# Patient Record
Sex: MALE | URBAN - METROPOLITAN AREA
[De-identification: names, ages, dates, MRNs, and addresses within clinical notes are randomized per-mention and may not be internally consistent; named-entity substitution may affect disease eponyms.]

---

## 2022-11-14 ENCOUNTER — APPOINTMENT (RX ONLY)
Dept: URBAN - METROPOLITAN AREA CLINIC 86 | Facility: CLINIC | Age: 77
Setting detail: DERMATOLOGY
End: 2022-11-14

## 2022-11-14 VITALS — WEIGHT: 145 LBS | HEIGHT: 67 IN

## 2022-11-14 DIAGNOSIS — L21.8 OTHER SEBORRHEIC DERMATITIS: ICD-10-CM | Status: INADEQUATELY CONTROLLED

## 2022-11-14 DIAGNOSIS — L57.0 ACTINIC KERATOSIS: ICD-10-CM | Status: STABLE

## 2022-11-14 DIAGNOSIS — L57.8 OTHER SKIN CHANGES DUE TO CHRONIC EXPOSURE TO NONIONIZING RADIATION: ICD-10-CM | Status: STABLE

## 2022-11-14 DIAGNOSIS — D18.0 HEMANGIOMA: ICD-10-CM | Status: STABLE

## 2022-11-14 PROBLEM — D18.01 HEMANGIOMA OF SKIN AND SUBCUTANEOUS TISSUE: Status: ACTIVE | Noted: 2022-11-14

## 2022-11-14 PROCEDURE — ? PRESCRIPTION

## 2022-11-14 PROCEDURE — 17000 DESTRUCT PREMALG LESION: CPT

## 2022-11-14 PROCEDURE — 17003 DESTRUCT PREMALG LES 2-14: CPT

## 2022-11-14 PROCEDURE — ? LIQUID NITROGEN

## 2022-11-14 PROCEDURE — 99204 OFFICE O/P NEW MOD 45 MIN: CPT | Mod: 25

## 2022-11-14 PROCEDURE — ? PRESCRIPTION MEDICATION MANAGEMENT

## 2022-11-14 PROCEDURE — ? COUNSELING

## 2022-11-14 RX ORDER — FLUOROURACIL 40 MG/G
CREAM TOPICAL QD
Qty: 1 | Refills: 0 | Status: ERX | COMMUNITY
Start: 2022-11-14

## 2022-11-14 RX ORDER — KETOCONAZOLE 20 MG/G
CREAM TOPICAL
Qty: 60 | Refills: 3 | Status: ERX | COMMUNITY
Start: 2022-11-14

## 2022-11-14 RX ADMIN — KETOCONAZOLE: 20 CREAM TOPICAL at 00:00

## 2022-11-14 RX ADMIN — FLUOROURACIL: 40 CREAM TOPICAL at 00:00

## 2022-11-14 ASSESSMENT — LOCATION ZONE DERM
LOCATION ZONE: LEG
LOCATION ZONE: NECK
LOCATION ZONE: FACE
LOCATION ZONE: EAR
LOCATION ZONE: NOSE
LOCATION ZONE: TRUNK
LOCATION ZONE: ARM

## 2022-11-14 ASSESSMENT — LOCATION SIMPLE DESCRIPTION DERM
LOCATION SIMPLE: LEFT CHEEK
LOCATION SIMPLE: RIGHT CALF
LOCATION SIMPLE: RIGHT UPPER BACK
LOCATION SIMPLE: RIGHT EAR
LOCATION SIMPLE: LEFT FOREARM
LOCATION SIMPLE: LEFT LOWER BACK
LOCATION SIMPLE: LEFT UPPER ARM
LOCATION SIMPLE: LEFT THIGH
LOCATION SIMPLE: INFERIOR FOREHEAD
LOCATION SIMPLE: RIGHT POSTERIOR THIGH
LOCATION SIMPLE: LEFT SHOULDER
LOCATION SIMPLE: CHEST
LOCATION SIMPLE: LEFT POSTERIOR THIGH
LOCATION SIMPLE: RIGHT ANTERIOR NECK
LOCATION SIMPLE: LEFT CALF
LOCATION SIMPLE: LEFT PRETIBIAL REGION
LOCATION SIMPLE: RIGHT THIGH
LOCATION SIMPLE: NOSE
LOCATION SIMPLE: RIGHT UPPER ARM
LOCATION SIMPLE: RIGHT PRETIBIAL REGION
LOCATION SIMPLE: RIGHT SHOULDER
LOCATION SIMPLE: LEFT EAR
LOCATION SIMPLE: GLABELLA
LOCATION SIMPLE: RIGHT FOREARM
LOCATION SIMPLE: RIGHT CHEEK
LOCATION SIMPLE: LEFT NOSE
LOCATION SIMPLE: ABDOMEN

## 2022-11-14 ASSESSMENT — LOCATION DETAILED DESCRIPTION DERM
LOCATION DETAILED: RIGHT MEDIAL SUPERIOR CHEST
LOCATION DETAILED: LEFT INFERIOR CENTRAL MALAR CHEEK
LOCATION DETAILED: RIGHT CENTRAL MALAR CHEEK
LOCATION DETAILED: GLABELLA
LOCATION DETAILED: NASAL SUPRATIP
LOCATION DETAILED: RIGHT PROXIMAL PRETIBIAL REGION
LOCATION DETAILED: LEFT SUPERIOR MEDIAL LOWER BACK
LOCATION DETAILED: LEFT ANTERIOR DISTAL THIGH
LOCATION DETAILED: LEFT NASAL SIDEWALL
LOCATION DETAILED: LEFT CENTRAL MALAR CHEEK
LOCATION DETAILED: LEFT ANTERIOR DISTAL UPPER ARM
LOCATION DETAILED: LEFT CENTRAL BUCCAL CHEEK
LOCATION DETAILED: RIGHT POSTERIOR SHOULDER
LOCATION DETAILED: LEFT SUPERIOR HELIX
LOCATION DETAILED: LEFT PROXIMAL PRETIBIAL REGION
LOCATION DETAILED: RIGHT ANTERIOR DISTAL UPPER ARM
LOCATION DETAILED: LEFT DISTAL DORSAL FOREARM
LOCATION DETAILED: LEFT POSTERIOR SHOULDER
LOCATION DETAILED: RIGHT DISTAL CALF
LOCATION DETAILED: LEFT MEDIAL MALAR CHEEK
LOCATION DETAILED: LEFT ANTERIOR PROXIMAL THIGH
LOCATION DETAILED: INFERIOR MID FOREHEAD
LOCATION DETAILED: RIGHT ANTERIOR DISTAL THIGH
LOCATION DETAILED: LEFT ANTERIOR SHOULDER
LOCATION DETAILED: LEFT DISTAL LATERAL POSTERIOR THIGH
LOCATION DETAILED: LEFT PROXIMAL CALF
LOCATION DETAILED: LEFT PROXIMAL POSTERIOR UPPER ARM
LOCATION DETAILED: RIGHT SUPERIOR MEDIAL UPPER BACK
LOCATION DETAILED: LEFT SUPERIOR LATERAL MALAR CHEEK
LOCATION DETAILED: RIGHT CLAVICULAR NECK
LOCATION DETAILED: RIGHT INFERIOR CENTRAL MALAR CHEEK
LOCATION DETAILED: PERIUMBILICAL SKIN
LOCATION DETAILED: RIGHT DISTAL DORSAL FOREARM
LOCATION DETAILED: RIGHT PROXIMAL RADIAL DORSAL FOREARM
LOCATION DETAILED: RIGHT SUPERIOR UPPER BACK
LOCATION DETAILED: RIGHT SUPERIOR MEDIAL MALAR CHEEK
LOCATION DETAILED: RIGHT PROXIMAL POSTERIOR UPPER ARM
LOCATION DETAILED: RIGHT INFERIOR HELIX
LOCATION DETAILED: RIGHT PROXIMAL POSTERIOR THIGH
LOCATION DETAILED: RIGHT LATERAL MALAR CHEEK

## 2022-11-14 ASSESSMENT — TOTAL NUMBER OF LESIONS: # OF LESIONS?: 12

## 2022-11-14 ASSESSMENT — SEVERITY ASSESSMENT: HOW SEVERE IS THIS PATIENT'S CONDITION?: MILD

## 2022-11-14 ASSESSMENT — PAIN INTENSITY VAS: HOW INTENSE IS YOUR PAIN 0 BEING NO PAIN, 10 BEING THE MOST SEVERE PAIN POSSIBLE?: NO PAIN

## 2022-11-14 NOTE — PROCEDURE: LIQUID NITROGEN
Render Note In Bullet Format When Appropriate: No
Number Of Freeze-Thaw Cycles: 2 freeze-thaw cycles
Show Aperture Variable?: Yes
Post-Care Instructions: I reviewed with the patient in detail post-care instructions. Patient is to wear sunprotection, and avoid picking at any of the treated lesions. Pt may apply Vaseline to crusted or scabbing areas.
Duration Of Freeze Thaw-Cycle (Seconds): 2
Consent: The patient's consent was obtained including but not limited to risks of crusting, scabbing, blistering, scarring, darker or lighter pigmentary change, recurrence, incomplete removal and infection.
Detail Level: Simple

## 2022-11-14 NOTE — PROCEDURE: PRESCRIPTION MEDICATION MANAGEMENT
Detail Level: Zone
Initiate Treatment: [place] apply to face twice daily for 2 weeks
Render In Strict Bullet Format?: No
Initiate Treatment: Ketoconazole cream apply bid to seborrhea

## 2023-01-18 ENCOUNTER — APPOINTMENT (RX ONLY)
Dept: URBAN - METROPOLITAN AREA CLINIC 86 | Facility: CLINIC | Age: 78
Setting detail: DERMATOLOGY
End: 2023-01-18

## 2023-01-18 VITALS — HEIGHT: 67 IN | WEIGHT: 145 LBS

## 2023-01-18 DIAGNOSIS — L24.4 IRRITANT CONTACT DERMATITIS DUE TO DRUGS IN CONTACT WITH SKIN: ICD-10-CM | Status: IMPROVED

## 2023-01-18 DIAGNOSIS — L71.8 OTHER ROSACEA: ICD-10-CM | Status: INADEQUATELY CONTROLLED

## 2023-01-18 PROCEDURE — ? PRESCRIPTION MEDICATION MANAGEMENT

## 2023-01-18 PROCEDURE — ? COUNSELING

## 2023-01-18 PROCEDURE — ? PRESCRIPTION

## 2023-01-18 PROCEDURE — 99214 OFFICE O/P EST MOD 30 MIN: CPT

## 2023-01-18 RX ORDER — DOXYCYCLINE HYCLATE 20 MG/1
TABLET, FILM COATED ORAL BID
Qty: 60 | Refills: 2 | Status: ERX | COMMUNITY
Start: 2023-01-18

## 2023-01-18 RX ADMIN — DOXYCYCLINE HYCLATE: 20 TABLET, FILM COATED ORAL at 00:00

## 2023-01-18 ASSESSMENT — LOCATION DETAILED DESCRIPTION DERM
LOCATION DETAILED: RIGHT CENTRAL MALAR CHEEK
LOCATION DETAILED: NASAL DORSUM
LOCATION DETAILED: RIGHT MEDIAL MALAR CHEEK
LOCATION DETAILED: LEFT MEDIAL MALAR CHEEK
LOCATION DETAILED: NASAL SUPRATIP
LOCATION DETAILED: LEFT CENTRAL MALAR CHEEK

## 2023-01-18 ASSESSMENT — LOCATION ZONE DERM
LOCATION ZONE: FACE
LOCATION ZONE: NOSE

## 2023-01-18 ASSESSMENT — SEVERITY ASSESSMENT OVERALL AMONG ALL PATIENTS
IN YOUR EXPERIENCE, AMONG ALL PATIENTS YOU HAVE SEEN WITH THIS CONDITION, HOW SEVERE IS THIS PATIENT'S CONDITION?: MODERATE

## 2023-01-18 ASSESSMENT — LOCATION SIMPLE DESCRIPTION DERM
LOCATION SIMPLE: RIGHT CHEEK
LOCATION SIMPLE: LEFT CHEEK
LOCATION SIMPLE: NOSE

## 2023-01-18 ASSESSMENT — SEVERITY ASSESSMENT 2020: SEVERITY 2020: MILD

## 2023-01-18 ASSESSMENT — BSA RASH: BSA RASH: 1

## 2023-01-18 NOTE — PROCEDURE: PRESCRIPTION MEDICATION MANAGEMENT
Plan: Doxycycline 20mg BID
Samples Given: Finacea 1% cream
Detail Level: Zone
Render In Strict Bullet Format?: No

## 2023-01-18 NOTE — PROCEDURE: MIPS QUALITY
Quality 226: Preventive Care And Screening: Tobacco Use: Screening And Cessation Intervention: Patient screened for tobacco use and is an ex/non-smoker
Quality 394b: Td/Tdap Immunizations For Adolescents: Patient had one tetanus, diphtheria toxoids and acellular pertussis vaccine (Tdap) on or between the patient's 10th and 13th birthdays.
Quality 111:Pneumonia Vaccination Status For Older Adults: Pneumococcal vaccine (PPSV23) administered on or after patient’s 60th birthday and before the end of the measurement period
Detail Level: Detailed
Quality 47: Advance Care Plan: Advance Care Planning discussed and documented; advance care plan or surrogate decision maker documented in the medical record.
Additional Notes: Shingles vaccine: Yes
Quality 110: Preventive Care And Screening: Influenza Immunization: Influenza Immunization Administered during Influenza season

## 2024-07-26 ENCOUNTER — INPATIENT (INPATIENT)
Facility: HOSPITAL | Age: 79
LOS: 10 days | Discharge: NURSING, REHAB OR LONG-TERM CARE | DRG: 536 | End: 2024-08-06
Attending: ORTHOPAEDIC SURGERY | Admitting: ORTHOPAEDIC SURGERY
Payer: MEDICARE

## 2024-07-26 VITALS
HEIGHT: 67 IN | WEIGHT: 145.06 LBS | SYSTOLIC BLOOD PRESSURE: 138 MMHG | RESPIRATION RATE: 20 BRPM | HEART RATE: 76 BPM | DIASTOLIC BLOOD PRESSURE: 98 MMHG | TEMPERATURE: 98 F | OXYGEN SATURATION: 96 %

## 2024-07-26 DIAGNOSIS — S72.009A FRACTURE OF UNSPECIFIED PART OF NECK OF UNSPECIFIED FEMUR, INITIAL ENCOUNTER FOR CLOSED FRACTURE: ICD-10-CM

## 2024-07-26 LAB
ALBUMIN SERPL ELPH-MCNC: 4.2 G/DL — SIGNIFICANT CHANGE UP (ref 3.3–5)
ALP SERPL-CCNC: 61 U/L — SIGNIFICANT CHANGE UP (ref 40–120)
ALT FLD-CCNC: 21 U/L — SIGNIFICANT CHANGE UP (ref 10–45)
ANION GAP SERPL CALC-SCNC: 13 MMOL/L — SIGNIFICANT CHANGE UP (ref 5–17)
APTT BLD: 35 SEC — SIGNIFICANT CHANGE UP (ref 24.5–35.6)
AST SERPL-CCNC: 18 U/L — SIGNIFICANT CHANGE UP (ref 10–40)
BASOPHILS # BLD AUTO: 0.02 K/UL — SIGNIFICANT CHANGE UP (ref 0–0.2)
BASOPHILS NFR BLD AUTO: 0.4 % — SIGNIFICANT CHANGE UP (ref 0–2)
BILIRUB SERPL-MCNC: 0.3 MG/DL — SIGNIFICANT CHANGE UP (ref 0.2–1.2)
BLD GP AB SCN SERPL QL: NEGATIVE — SIGNIFICANT CHANGE UP
BUN SERPL-MCNC: 22 MG/DL — SIGNIFICANT CHANGE UP (ref 7–23)
CALCIUM SERPL-MCNC: 9.5 MG/DL — SIGNIFICANT CHANGE UP (ref 8.4–10.5)
CHLORIDE SERPL-SCNC: 108 MMOL/L — SIGNIFICANT CHANGE UP (ref 96–108)
CO2 SERPL-SCNC: 23 MMOL/L — SIGNIFICANT CHANGE UP (ref 22–31)
CREAT SERPL-MCNC: 1.29 MG/DL — SIGNIFICANT CHANGE UP (ref 0.5–1.3)
EGFR: 57 ML/MIN/1.73M2 — LOW
EOSINOPHIL # BLD AUTO: 0.03 K/UL — SIGNIFICANT CHANGE UP (ref 0–0.5)
EOSINOPHIL NFR BLD AUTO: 0.6 % — SIGNIFICANT CHANGE UP (ref 0–6)
GLUCOSE SERPL-MCNC: 122 MG/DL — HIGH (ref 70–99)
HCT VFR BLD CALC: 44.4 % — SIGNIFICANT CHANGE UP (ref 39–50)
HGB BLD-MCNC: 14.1 G/DL — SIGNIFICANT CHANGE UP (ref 13–17)
IMM GRANULOCYTES NFR BLD AUTO: 0.4 % — SIGNIFICANT CHANGE UP (ref 0–0.9)
INR BLD: 1.11 RATIO — SIGNIFICANT CHANGE UP (ref 0.85–1.18)
LYMPHOCYTES # BLD AUTO: 1.57 K/UL — SIGNIFICANT CHANGE UP (ref 1–3.3)
LYMPHOCYTES # BLD AUTO: 31.2 % — SIGNIFICANT CHANGE UP (ref 13–44)
MCHC RBC-ENTMCNC: 31.4 PG — SIGNIFICANT CHANGE UP (ref 27–34)
MCHC RBC-ENTMCNC: 31.8 GM/DL — LOW (ref 32–36)
MCV RBC AUTO: 98.9 FL — SIGNIFICANT CHANGE UP (ref 80–100)
MONOCYTES # BLD AUTO: 0.44 K/UL — SIGNIFICANT CHANGE UP (ref 0–0.9)
MONOCYTES NFR BLD AUTO: 8.7 % — SIGNIFICANT CHANGE UP (ref 2–14)
NEUTROPHILS # BLD AUTO: 2.96 K/UL — SIGNIFICANT CHANGE UP (ref 1.8–7.4)
NEUTROPHILS NFR BLD AUTO: 58.7 % — SIGNIFICANT CHANGE UP (ref 43–77)
NRBC # BLD: 0 /100 WBCS — SIGNIFICANT CHANGE UP (ref 0–0)
PLATELET # BLD AUTO: 152 K/UL — SIGNIFICANT CHANGE UP (ref 150–400)
POTASSIUM SERPL-MCNC: 4.3 MMOL/L — SIGNIFICANT CHANGE UP (ref 3.5–5.3)
POTASSIUM SERPL-SCNC: 4.3 MMOL/L — SIGNIFICANT CHANGE UP (ref 3.5–5.3)
PROT SERPL-MCNC: 6.5 G/DL — SIGNIFICANT CHANGE UP (ref 6–8.3)
PROTHROM AB SERPL-ACNC: 11.6 SEC — SIGNIFICANT CHANGE UP (ref 9.5–13)
RBC # BLD: 4.49 M/UL — SIGNIFICANT CHANGE UP (ref 4.2–5.8)
RBC # FLD: 12.6 % — SIGNIFICANT CHANGE UP (ref 10.3–14.5)
RH IG SCN BLD-IMP: POSITIVE — SIGNIFICANT CHANGE UP
SODIUM SERPL-SCNC: 144 MMOL/L — SIGNIFICANT CHANGE UP (ref 135–145)
WBC # BLD: 5.04 K/UL — SIGNIFICANT CHANGE UP (ref 3.8–10.5)
WBC # FLD AUTO: 5.04 K/UL — SIGNIFICANT CHANGE UP (ref 3.8–10.5)

## 2024-07-26 PROCEDURE — 73502 X-RAY EXAM HIP UNI 2-3 VIEWS: CPT | Mod: 26,RT

## 2024-07-26 PROCEDURE — 73552 X-RAY EXAM OF FEMUR 2/>: CPT | Mod: 26,RT

## 2024-07-26 PROCEDURE — 71045 X-RAY EXAM CHEST 1 VIEW: CPT | Mod: 26

## 2024-07-26 PROCEDURE — 99285 EMERGENCY DEPT VISIT HI MDM: CPT | Mod: GC

## 2024-07-26 RX ORDER — FLUDROCORTISONE ACETATE 0.1 MG/1
0.1 TABLET ORAL DAILY
Refills: 0 | Status: DISCONTINUED | OUTPATIENT
Start: 2024-07-26 | End: 2024-07-27

## 2024-07-26 RX ORDER — BACTERIOSTATIC SODIUM CHLORIDE 0.9 %
1000 VIAL (ML) INJECTION
Refills: 0 | Status: DISCONTINUED | OUTPATIENT
Start: 2024-07-26 | End: 2024-07-27

## 2024-07-26 RX ORDER — ACETAMINOPHEN 500 MG
1000 TABLET ORAL ONCE
Refills: 0 | Status: COMPLETED | OUTPATIENT
Start: 2024-07-26 | End: 2024-07-26

## 2024-07-26 RX ORDER — FLECAINIDE ACETATE 100 MG/1
1 TABLET ORAL
Refills: 0 | DISCHARGE

## 2024-07-26 RX ORDER — ACETAMINOPHEN 500 MG
975 TABLET ORAL EVERY 8 HOURS
Refills: 0 | Status: DISCONTINUED | OUTPATIENT
Start: 2024-07-26 | End: 2024-07-27

## 2024-07-26 RX ORDER — MEMANTINE HYDROCHLORIDE 14 MG/1
10 CAPSULE, EXTENDED RELEASE ORAL
Refills: 0 | Status: DISCONTINUED | OUTPATIENT
Start: 2024-07-26 | End: 2024-07-27

## 2024-07-26 RX ORDER — CHLORHEXIDINE GLUCONATE 500 MG/1
1 CLOTH TOPICAL ONCE
Refills: 0 | Status: COMPLETED | OUTPATIENT
Start: 2024-07-26 | End: 2024-07-27

## 2024-07-26 RX ORDER — DONEPEZIL HCL 5 MG
1 TABLET ORAL
Refills: 0 | DISCHARGE

## 2024-07-26 RX ORDER — MEMANTINE HYDROCHLORIDE 14 MG/1
1 CAPSULE, EXTENDED RELEASE ORAL
Refills: 0 | DISCHARGE

## 2024-07-26 RX ORDER — BUPROPION HCL 100 MG
150 TABLET,SUSTAINED-RELEASE 12 HR ORAL DAILY
Refills: 0 | Status: DISCONTINUED | OUTPATIENT
Start: 2024-07-26 | End: 2024-07-27

## 2024-07-26 RX ORDER — POVIDONE-IODINE 0.1 G/ML
1 SOLUTION TOPICAL ONCE
Refills: 0 | Status: COMPLETED | OUTPATIENT
Start: 2024-07-26 | End: 2024-07-27

## 2024-07-26 RX ORDER — PANTOPRAZOLE SODIUM 20 MG/1
40 TABLET, DELAYED RELEASE ORAL
Refills: 0 | Status: DISCONTINUED | OUTPATIENT
Start: 2024-07-26 | End: 2024-07-27

## 2024-07-26 RX ORDER — OXYCODONE HYDROCHLORIDE 30 MG/1
5 TABLET ORAL EVERY 4 HOURS
Refills: 0 | Status: DISCONTINUED | OUTPATIENT
Start: 2024-07-26 | End: 2024-07-27

## 2024-07-26 RX ORDER — MELATONIN 3 MG
3 TABLET ORAL AT BEDTIME
Refills: 0 | Status: DISCONTINUED | OUTPATIENT
Start: 2024-07-26 | End: 2024-07-27

## 2024-07-26 RX ORDER — ASPIRIN 325 MG
10 TABLET ORAL DAILY
Refills: 0 | Status: CANCELLED | OUTPATIENT
Start: 2024-07-28 | End: 2024-07-27

## 2024-07-26 RX ORDER — SENNOSIDES 8.6 MG/1
2 TABLET ORAL AT BEDTIME
Refills: 0 | Status: DISCONTINUED | OUTPATIENT
Start: 2024-07-26 | End: 2024-07-27

## 2024-07-26 RX ORDER — OXYCODONE HYDROCHLORIDE 30 MG/1
10 TABLET ORAL EVERY 4 HOURS
Refills: 0 | Status: DISCONTINUED | OUTPATIENT
Start: 2024-07-26 | End: 2024-07-27

## 2024-07-26 RX ORDER — FLUDROCORTISONE ACETATE 0.1 MG/1
1 TABLET ORAL
Refills: 0 | DISCHARGE

## 2024-07-26 RX ORDER — DONEPEZIL HCL 5 MG
5 TABLET ORAL AT BEDTIME
Refills: 0 | Status: DISCONTINUED | OUTPATIENT
Start: 2024-07-26 | End: 2024-07-27

## 2024-07-26 RX ORDER — BUPROPION HCL 100 MG
1 TABLET,SUSTAINED-RELEASE 12 HR ORAL
Refills: 0 | DISCHARGE

## 2024-07-26 RX ORDER — FLECAINIDE ACETATE 100 MG/1
50 TABLET ORAL
Refills: 0 | Status: DISCONTINUED | OUTPATIENT
Start: 2024-07-26 | End: 2024-07-27

## 2024-07-26 RX ORDER — OMEGA-3-ACID ETHYL ESTERS 1 G/1
1 CAPSULE, LIQUID FILLED ORAL
Refills: 0 | DISCHARGE

## 2024-07-26 RX ADMIN — Medication 400 MILLIGRAM(S): at 18:57

## 2024-07-26 RX ADMIN — FLECAINIDE ACETATE 50 MILLIGRAM(S): 100 TABLET ORAL at 23:17

## 2024-07-26 RX ADMIN — Medication 5 MILLIGRAM(S): at 23:17

## 2024-07-26 RX ADMIN — SENNOSIDES 2 TABLET(S): 8.6 TABLET ORAL at 23:16

## 2024-07-26 RX ADMIN — Medication 3 MILLIGRAM(S): at 21:27

## 2024-07-26 RX ADMIN — Medication 1000 MILLIGRAM(S): at 19:27

## 2024-07-26 RX ADMIN — MEMANTINE HYDROCHLORIDE 10 MILLIGRAM(S): 14 CAPSULE, EXTENDED RELEASE ORAL at 23:17

## 2024-07-26 RX ADMIN — Medication 125 MILLILITER(S): at 23:25

## 2024-07-26 NOTE — ED PROVIDER NOTE - ATTENDING CONTRIBUTION TO CARE
78-year-old male past medical history of hypertension, hyperlipidemia presents ED status post mechanical fall on right hip.  Hip fracture order set was used analgesia offered plain films denies any head strike not on LOC not on AC at this time rule out fracture likely Ortho consult patient cannot ambulate only passively can range the leg, no shortening or ext rotation.

## 2024-07-26 NOTE — ED PROVIDER NOTE - PROGRESS NOTE DETAILS
Willem Sanchez MD: I have been given all relevant clinical information regarding this patient and will be assuming care from the previous provider. Patient has right intertrochanteric hip fracture, will be admitted to orthopedic surgery for am surgery.

## 2024-07-26 NOTE — ED ADULT NURSE NOTE - IN ACCORDANCE WITH [place] STATE LAW, WE OFFER EVERY PATIENT A HEPATITIS C TEST. WOULD YOU LIKE TO BE TESTED TODAY?
LM on  VM informing pt med refilled 10/25/21, #90 + 1 refill to [facility] [address]. Refill may have been denied because refill was previously addressed/sent.   Opt out

## 2024-07-26 NOTE — PATIENT PROFILE ADULT - FALL HARM RISK - HARM RISK INTERVENTIONS
Assistance with ambulation/Assistance OOB with selected safe patient handling equipment/Communicate Risk of Fall with Harm to all staff/Discuss with provider need for PT consult/Monitor gait and stability/Reinforce activity limits and safety measures with patient and family/Tailored Fall Risk Interventions/Visual Cue: Yellow wristband and red socks/Bed in lowest position, wheels locked, appropriate side rails in place/Call bell, personal items and telephone in reach/Instruct patient to call for assistance before getting out of bed or chair/Non-slip footwear when patient is out of bed/[place] to call system/Physically safe environment - no spills, clutter or unnecessary equipment/Purposeful Proactive Rounding/Room/bathroom lighting operational, light cord in reach

## 2024-07-26 NOTE — H&P ADULT - NSHPLABSRESULTS_GEN_ALL_CORE
LABS                        14.1   5.04  )-----------( 152      ( 26 Jul 2024 19:15 )             44.4     07-26    144  |  108  |  22  ----------------------------<  122<H>  4.3   |  23  |  1.29    Ca    9.5      26 Jul 2024 19:15    TPro  6.5  /  Alb  4.2  /  TBili  0.3  /  DBili  x   /  AST  18  /  ALT  21  /  AlkPhos  61  07-26    PT/INR - ( 26 Jul 2024 19:15 )   PT: 11.6 sec;   INR: 1.11 ratio         PTT - ( 26 Jul 2024 19:15 )  PTT:35.0 sec    IMAGING  TECHNIQUE: Single frontal view of the pelvis. 2 views of the right hip. 2 views of the right femur, 4 images.    COMPARISON: No similar prior comparisons available.    FINDINGS:  Acute minimally displaced right intertrochanteric fracture.  No other fracture or dislocation.  Surgical clips overlie the lower pelvis.  Hip joint spaces are maintained.  Intact pelvic and obturator rings.    PRELIMINARY  IMPRESSION:  Acute minimally displaced right intertrochanteric fracture.

## 2024-07-26 NOTE — ED PROVIDER NOTE - PHYSICAL EXAMINATION
Gen: AAOx3, non-toxic  Head: NCAT  HEENT: EOMI, oral mucosa moist, normal conjunctiva  Lung: CTAB, no respiratory distress, no wheezes/rhonchi/rales B/L,   CV: RRR, no murmurs, rubs or gallops  Abd: soft, NTND, no guarding, no CVA tenderness  MSK: Right lower extremity mildly externally rotated, moderate tenderness to palpation to right greater trochanter, unable to flex, extend hip neither passive nor active range of motion  Neuro: No focal sensory or motor deficits  Skin: Warm, well perfused, no rash  Psych: normal affect.

## 2024-07-26 NOTE — H&P ADULT - NSHPPHYSICALEXAM_GEN_ALL_CORE
VITALS  Vital Signs Last 24 Hrs  T(C): 36.7 (26 Jul 2024 17:54), Max: 36.7 (26 Jul 2024 17:54)  T(F): 98.1 (26 Jul 2024 17:54), Max: 98.1 (26 Jul 2024 17:54)  HR: 76 (26 Jul 2024 17:54) (76 - 76)  BP: 138/98 (26 Jul 2024 17:54) (138/98 - 138/98)  BP(mean): --  RR: 20 (26 Jul 2024 17:54) (20 - 20)  SpO2: 96% (26 Jul 2024 17:54) (96% - 96%)    Parameters below as of 26 Jul 2024 17:54  Patient On (Oxygen Delivery Method): room air        PHYSICAL EXAM  Gen: Lying in bed, NAD  Resp: No increased WOB  RLE:  Skin intact, shortened and externally rotated, +edema and +ecchymosis over R hip  +TTP over R hip, no TTP along remainder of extremity; compartments soft  Limited ROM at hip 2/2 pain  +Log roll test  +Pain with axial loading  Motor: TA/EHL/GS/FHL intact  Sensory: DP/SP/Tib/Lionel/Saph SILT  +DP pulse, WWP    Secondary survey:  No TTP along spine or other extremities, pelvis grossly stable, SILT and compartments soft throughout

## 2024-07-26 NOTE — ED PROVIDER NOTE - OBJECTIVE STATEMENT
78-year-old male past medical history of hypertension, hyperlipidemia presents ED status post mechanical fall on right hip.  Patient was outside on the pool, tripped onto 1 step struck right hip now with persistent right hip pain.  Denies head strike, no LOC, not on any AC, was not ambulatory afterwards.  Did not strike anything else.  Denies fevers chills nausea vomiting diarrhea chest pain shortness of breath abdominal pain dysuria hematuria.  Denies prodromal symptoms.

## 2024-07-26 NOTE — ED ADULT NURSE NOTE - NSFALLRISKINTERV_ED_ALL_ED

## 2024-07-26 NOTE — H&P ADULT - HISTORY OF PRESENT ILLNESS
HPI  78yMale w/ HTN, HLD, AFib (no AC), mild dementia, urinary incontinence c/o R hip pain s/p mechanical fall into pool earlier today. Unable to bear weight in the RLE since the fall. Denies headstrike or LOC. Denies numbness/tingling in the RLE. Denies any other trauma/injuries at this time. At baseline, community ambulator w/o assistive devices. Patient and his partner are visiting from [place].

## 2024-07-26 NOTE — H&P ADULT - ASSESSMENT
ASSESSMENT & PLAN  78yMale w/ R IT fx.  -NWB RLE, bedrest  -may require 1:1 for sundowning   -OR on 7/27  -f/u preop: CBC, BMP, coags, T&S x2, CXR, EKG, COVID, hCG [women only]  -NPO past midnight, IVF  -hold chemical DVT ppx for OR; SCDs OK  -please document medical clearance ASAP for OR  -pain control  -ice/cold compress    For all questions related to patient care, please reach out via the on-call pager.*     Stephanie Cavazos PGY2  Orthopedic Surgery  [facility]: [phone]  [facility]: [id number]  [facility]: [id number]

## 2024-07-26 NOTE — ED ADULT NURSE NOTE - OBJECTIVE STATEMENT
79 y/o male arrives to the ER complaining of fall. Pt reports right hip pain s/p mechanical trip and fall down 3 steps. Pt reports no LOC, no head strike, no a/c   Pt denies SOB, chest pain, dizziness, prior the fall. On assessment pt is well appearing, A&Ox4, speaking coherently, airway is patent, breathing spontaneously and unlabored. Skin is dry, warm. Pt denies any numbness or tingling. Peripheral pulses are strong and equal in bilateral extremities. Pt has equal ROM in extremities. No abrasions, redness or swelling present in extremity. 79 y/o male arrives to the ER complaining of fall. Pt reports right hip pain s/p mechanical trip and fall down 3 steps. No able to put weight on the R leg after fall. Pt reports no LOC, no head strike, no a/c   Pt denies SOB, chest pain, dizziness, prior the fall. On assessment pt is well appearing, A&Ox4, speaking coherently, airway is patent, breathing spontaneously and unlabored. Skin is dry, warm. Pt denies any numbness or tingling. Peripheral pulses are strong and equal in bilateral extremities. No abrasions, redness or swelling present in extremity.

## 2024-07-26 NOTE — ED PROVIDER NOTE - CLINICAL SUMMARY MEDICAL DECISION MAKING FREE TEXT BOX
78-year-old male past medical history of hypertension, hyperlipidemia presents ED status post mechanical fall on right hip.  Patient was outside on the pool, tripped onto 1 step struck right hip now with persistent right hip pain.  Denies head strike, no LOC, not on any AC, was not ambulatory afterwards.  Did not strike anything else.  Denies fevers chills nausea vomiting diarrhea chest pain shortness of breath abdominal pain dysuria hematuria.  Denies prodromal symptoms.    VS. Clinically stable. EKG wnl w no ST elevations or T wave inversions. PE, well appearing, no acute distress, AAOx3. NCAT, EOMI, normal conjunctiva, mucous membranes moist, LCTAB no w/r/c, no MRG, RRR, abd NDNT, no rebound tenderness or guarding, no CVA ttp, no focal neuro deficits, neurovascularly intact, dp 2+, no bruising, rashes, or erythema, right lower extremity mildly externally rotated, moderate tenderness to palpation to right greater trochanter, unable to flex, extend hip neither passive nor active range of motion.. Suspicion for intertroc fx vs dislocation vs bone bruise vs muscle contusion. will get preop labs, likely ortho cs

## 2024-07-27 DIAGNOSIS — F03.90 UNSPECIFIED DEMENTIA, UNSPECIFIED SEVERITY, WITHOUT BEHAVIORAL DISTURBANCE, PSYCHOTIC DISTURBANCE, MOOD DISTURBANCE, AND ANXIETY: ICD-10-CM

## 2024-07-27 DIAGNOSIS — I48.0 PAROXYSMAL ATRIAL FIBRILLATION: ICD-10-CM

## 2024-07-27 DIAGNOSIS — R52 PAIN, UNSPECIFIED: ICD-10-CM

## 2024-07-27 DIAGNOSIS — S72.001A FRACTURE OF UNSPECIFIED PART OF NECK OF RIGHT FEMUR, INITIAL ENCOUNTER FOR CLOSED FRACTURE: ICD-10-CM

## 2024-07-27 LAB
ANION GAP SERPL CALC-SCNC: 11 MMOL/L — SIGNIFICANT CHANGE UP (ref 5–17)
ANION GAP SERPL CALC-SCNC: 13 MMOL/L — SIGNIFICANT CHANGE UP (ref 5–17)
APTT BLD: 30.1 SEC — SIGNIFICANT CHANGE UP (ref 24.5–35.6)
BASOPHILS # BLD AUTO: 0.04 K/UL — SIGNIFICANT CHANGE UP (ref 0–0.2)
BASOPHILS NFR BLD AUTO: 0.5 % — SIGNIFICANT CHANGE UP (ref 0–2)
BUN SERPL-MCNC: 15 MG/DL — SIGNIFICANT CHANGE UP (ref 7–23)
BUN SERPL-MCNC: 20 MG/DL — SIGNIFICANT CHANGE UP (ref 7–23)
CALCIUM SERPL-MCNC: 8.2 MG/DL — LOW (ref 8.4–10.5)
CALCIUM SERPL-MCNC: 9.2 MG/DL — SIGNIFICANT CHANGE UP (ref 8.4–10.5)
CHLORIDE SERPL-SCNC: 106 MMOL/L — SIGNIFICANT CHANGE UP (ref 96–108)
CHLORIDE SERPL-SCNC: 107 MMOL/L — SIGNIFICANT CHANGE UP (ref 96–108)
CO2 SERPL-SCNC: 20 MMOL/L — LOW (ref 22–31)
CO2 SERPL-SCNC: 24 MMOL/L — SIGNIFICANT CHANGE UP (ref 22–31)
CREAT SERPL-MCNC: 0.96 MG/DL — SIGNIFICANT CHANGE UP (ref 0.5–1.3)
CREAT SERPL-MCNC: 1.11 MG/DL — SIGNIFICANT CHANGE UP (ref 0.5–1.3)
EGFR: 69 ML/MIN/1.73M2 — SIGNIFICANT CHANGE UP
EGFR: 82 ML/MIN/1.73M2 — SIGNIFICANT CHANGE UP
EOSINOPHIL # BLD AUTO: 0.03 K/UL — SIGNIFICANT CHANGE UP (ref 0–0.5)
EOSINOPHIL NFR BLD AUTO: 0.4 % — SIGNIFICANT CHANGE UP (ref 0–6)
GLUCOSE SERPL-MCNC: 113 MG/DL — HIGH (ref 70–99)
GLUCOSE SERPL-MCNC: 130 MG/DL — HIGH (ref 70–99)
HCT VFR BLD CALC: 39 % — SIGNIFICANT CHANGE UP (ref 39–50)
HCT VFR BLD CALC: 42.1 % — SIGNIFICANT CHANGE UP (ref 39–50)
HGB BLD-MCNC: 12.9 G/DL — LOW (ref 13–17)
HGB BLD-MCNC: 13.6 G/DL — SIGNIFICANT CHANGE UP (ref 13–17)
IMM GRANULOCYTES NFR BLD AUTO: 0.4 % — SIGNIFICANT CHANGE UP (ref 0–0.9)
INR BLD: 1.11 RATIO — SIGNIFICANT CHANGE UP (ref 0.85–1.18)
LYMPHOCYTES # BLD AUTO: 1.45 K/UL — SIGNIFICANT CHANGE UP (ref 1–3.3)
LYMPHOCYTES # BLD AUTO: 17.5 % — SIGNIFICANT CHANGE UP (ref 13–44)
MCHC RBC-ENTMCNC: 32 PG — SIGNIFICANT CHANGE UP (ref 27–34)
MCHC RBC-ENTMCNC: 32.3 GM/DL — SIGNIFICANT CHANGE UP (ref 32–36)
MCHC RBC-ENTMCNC: 32.5 PG — SIGNIFICANT CHANGE UP (ref 27–34)
MCHC RBC-ENTMCNC: 33.1 GM/DL — SIGNIFICANT CHANGE UP (ref 32–36)
MCV RBC AUTO: 98.2 FL — SIGNIFICANT CHANGE UP (ref 80–100)
MCV RBC AUTO: 99.1 FL — SIGNIFICANT CHANGE UP (ref 80–100)
MONOCYTES # BLD AUTO: 0.72 K/UL — SIGNIFICANT CHANGE UP (ref 0–0.9)
MONOCYTES NFR BLD AUTO: 8.7 % — SIGNIFICANT CHANGE UP (ref 2–14)
NEUTROPHILS # BLD AUTO: 6.01 K/UL — SIGNIFICANT CHANGE UP (ref 1.8–7.4)
NEUTROPHILS NFR BLD AUTO: 72.5 % — SIGNIFICANT CHANGE UP (ref 43–77)
NRBC # BLD: 0 /100 WBCS — SIGNIFICANT CHANGE UP (ref 0–0)
NRBC # BLD: 0 /100 WBCS — SIGNIFICANT CHANGE UP (ref 0–0)
PLATELET # BLD AUTO: 132 K/UL — LOW (ref 150–400)
PLATELET # BLD AUTO: 152 K/UL — SIGNIFICANT CHANGE UP (ref 150–400)
POTASSIUM SERPL-MCNC: 3.5 MMOL/L — SIGNIFICANT CHANGE UP (ref 3.5–5.3)
POTASSIUM SERPL-MCNC: 4 MMOL/L — SIGNIFICANT CHANGE UP (ref 3.5–5.3)
POTASSIUM SERPL-SCNC: 3.5 MMOL/L — SIGNIFICANT CHANGE UP (ref 3.5–5.3)
POTASSIUM SERPL-SCNC: 4 MMOL/L — SIGNIFICANT CHANGE UP (ref 3.5–5.3)
PROTHROM AB SERPL-ACNC: 11.6 SEC — SIGNIFICANT CHANGE UP (ref 9.5–13)
RBC # BLD: 3.97 M/UL — LOW (ref 4.2–5.8)
RBC # BLD: 4.25 M/UL — SIGNIFICANT CHANGE UP (ref 4.2–5.8)
RBC # FLD: 12.5 % — SIGNIFICANT CHANGE UP (ref 10.3–14.5)
RBC # FLD: 12.6 % — SIGNIFICANT CHANGE UP (ref 10.3–14.5)
SODIUM SERPL-SCNC: 140 MMOL/L — SIGNIFICANT CHANGE UP (ref 135–145)
SODIUM SERPL-SCNC: 141 MMOL/L — SIGNIFICANT CHANGE UP (ref 135–145)
WBC # BLD: 8.28 K/UL — SIGNIFICANT CHANGE UP (ref 3.8–10.5)
WBC # BLD: 8.94 K/UL — SIGNIFICANT CHANGE UP (ref 3.8–10.5)
WBC # FLD AUTO: 8.28 K/UL — SIGNIFICANT CHANGE UP (ref 3.8–10.5)
WBC # FLD AUTO: 8.94 K/UL — SIGNIFICANT CHANGE UP (ref 3.8–10.5)

## 2024-07-27 PROCEDURE — 27245 TREAT THIGH FRACTURE: CPT | Mod: RT

## 2024-07-27 DEVICE — IMPLANTABLE DEVICE: Type: IMPLANTABLE DEVICE | Site: RIGHT | Status: FUNCTIONAL

## 2024-07-27 DEVICE — SCREW LOKG STRL 5X34MM: Type: IMPLANTABLE DEVICE | Site: RIGHT | Status: FUNCTIONAL

## 2024-07-27 DEVICE — BLADE TFNA HELICAL 100MM STRL: Type: IMPLANTABLE DEVICE | Site: RIGHT | Status: FUNCTIONAL

## 2024-07-27 RX ORDER — FLECAINIDE ACETATE 100 MG/1
50 TABLET ORAL
Refills: 0 | Status: DISCONTINUED | OUTPATIENT
Start: 2024-07-27 | End: 2024-08-06

## 2024-07-27 RX ORDER — APIXABAN 5 MG/1
2.5 TABLET, FILM COATED ORAL EVERY 12 HOURS
Refills: 0 | Status: DISCONTINUED | OUTPATIENT
Start: 2024-07-28 | End: 2024-08-01

## 2024-07-27 RX ORDER — ACETAMINOPHEN 500 MG
975 TABLET ORAL EVERY 8 HOURS
Refills: 0 | Status: DISCONTINUED | OUTPATIENT
Start: 2024-07-27 | End: 2024-07-27

## 2024-07-27 RX ORDER — ONDANSETRON HCL/PF 4 MG/2 ML
4 VIAL (ML) INJECTION ONCE
Refills: 0 | Status: DISCONTINUED | OUTPATIENT
Start: 2024-07-27 | End: 2024-07-27

## 2024-07-27 RX ORDER — MEMANTINE HYDROCHLORIDE 14 MG/1
10 CAPSULE, EXTENDED RELEASE ORAL
Refills: 0 | Status: DISCONTINUED | OUTPATIENT
Start: 2024-07-27 | End: 2024-08-06

## 2024-07-27 RX ORDER — FENTANYL CITRATE 1200 UG/1
25 LOZENGE ORAL; TRANSMUCOSAL
Refills: 0 | Status: DISCONTINUED | OUTPATIENT
Start: 2024-07-27 | End: 2024-07-27

## 2024-07-27 RX ORDER — OXYCODONE HYDROCHLORIDE 30 MG/1
5 TABLET ORAL EVERY 4 HOURS
Refills: 0 | Status: DISCONTINUED | OUTPATIENT
Start: 2024-07-27 | End: 2024-07-27

## 2024-07-27 RX ORDER — ACETAMINOPHEN 500 MG
1000 TABLET ORAL EVERY 8 HOURS
Refills: 0 | Status: DISCONTINUED | OUTPATIENT
Start: 2024-07-28 | End: 2024-08-01

## 2024-07-27 RX ORDER — SENNOSIDES 8.6 MG/1
2 TABLET ORAL AT BEDTIME
Refills: 0 | Status: DISCONTINUED | OUTPATIENT
Start: 2024-07-27 | End: 2024-08-06

## 2024-07-27 RX ORDER — MELATONIN 3 MG
3 TABLET ORAL AT BEDTIME
Refills: 0 | Status: DISCONTINUED | OUTPATIENT
Start: 2024-07-27 | End: 2024-07-28

## 2024-07-27 RX ORDER — PANTOPRAZOLE SODIUM 20 MG/1
40 TABLET, DELAYED RELEASE ORAL
Refills: 0 | Status: DISCONTINUED | OUTPATIENT
Start: 2024-07-27 | End: 2024-08-06

## 2024-07-27 RX ORDER — BUPROPION HCL 100 MG
150 TABLET,SUSTAINED-RELEASE 12 HR ORAL DAILY
Refills: 0 | Status: DISCONTINUED | OUTPATIENT
Start: 2024-07-27 | End: 2024-08-06

## 2024-07-27 RX ORDER — CEFAZOLIN SODIUM 10 G
2000 VIAL (EA) INJECTION EVERY 8 HOURS
Refills: 0 | Status: COMPLETED | OUTPATIENT
Start: 2024-07-27 | End: 2024-07-28

## 2024-07-27 RX ORDER — OXYCODONE HYDROCHLORIDE 30 MG/1
10 TABLET ORAL EVERY 4 HOURS
Refills: 0 | Status: DISCONTINUED | OUTPATIENT
Start: 2024-07-27 | End: 2024-07-27

## 2024-07-27 RX ORDER — ACETAMINOPHEN 500 MG
1000 TABLET ORAL ONCE
Refills: 0 | Status: COMPLETED | OUTPATIENT
Start: 2024-07-28 | End: 2024-07-28

## 2024-07-27 RX ORDER — FLECAINIDE ACETATE 100 MG/1
50 TABLET ORAL
Refills: 0 | Status: DISCONTINUED | OUTPATIENT
Start: 2024-07-27 | End: 2024-07-27

## 2024-07-27 RX ORDER — DONEPEZIL HCL 5 MG
5 TABLET ORAL
Refills: 0 | Status: DISCONTINUED | OUTPATIENT
Start: 2024-07-27 | End: 2024-07-27

## 2024-07-27 RX ORDER — FLUDROCORTISONE ACETATE 0.1 MG/1
0.1 TABLET ORAL DAILY
Refills: 0 | Status: DISCONTINUED | OUTPATIENT
Start: 2024-07-27 | End: 2024-08-06

## 2024-07-27 RX ORDER — DONEPEZIL HCL 5 MG
5 TABLET ORAL
Refills: 0 | Status: DISCONTINUED | OUTPATIENT
Start: 2024-07-27 | End: 2024-08-06

## 2024-07-27 RX ORDER — ACETAMINOPHEN 500 MG
1000 TABLET ORAL ONCE
Refills: 0 | Status: COMPLETED | OUTPATIENT
Start: 2024-07-27 | End: 2024-07-27

## 2024-07-27 RX ADMIN — SENNOSIDES 2 TABLET(S): 8.6 TABLET ORAL at 22:18

## 2024-07-27 RX ADMIN — FLUDROCORTISONE ACETATE 0.1 MILLIGRAM(S): 0.1 TABLET ORAL at 06:54

## 2024-07-27 RX ADMIN — POVIDONE-IODINE 1 APPLICATION(S): 0.1 SOLUTION TOPICAL at 06:32

## 2024-07-27 RX ADMIN — MEMANTINE HYDROCHLORIDE 10 MILLIGRAM(S): 14 CAPSULE, EXTENDED RELEASE ORAL at 17:50

## 2024-07-27 RX ADMIN — PANTOPRAZOLE SODIUM 40 MILLIGRAM(S): 20 TABLET, DELAYED RELEASE ORAL at 06:55

## 2024-07-27 RX ADMIN — Medication 1000 MILLIGRAM(S): at 18:09

## 2024-07-27 RX ADMIN — Medication 400 MILLIGRAM(S): at 17:54

## 2024-07-27 RX ADMIN — CHLORHEXIDINE GLUCONATE 1 APPLICATION(S): 500 CLOTH TOPICAL at 06:05

## 2024-07-27 RX ADMIN — Medication 100 MILLIGRAM(S): at 18:59

## 2024-07-27 RX ADMIN — MEMANTINE HYDROCHLORIDE 10 MILLIGRAM(S): 14 CAPSULE, EXTENDED RELEASE ORAL at 06:54

## 2024-07-27 RX ADMIN — Medication 5 MILLIGRAM(S): at 17:50

## 2024-07-27 RX ADMIN — Medication 3 MILLIGRAM(S): at 22:17

## 2024-07-27 RX ADMIN — FLECAINIDE ACETATE 50 MILLIGRAM(S): 100 TABLET ORAL at 17:50

## 2024-07-27 RX ADMIN — FLECAINIDE ACETATE 50 MILLIGRAM(S): 100 TABLET ORAL at 07:55

## 2024-07-27 RX ADMIN — Medication 5 MILLIGRAM(S): at 06:54

## 2024-07-27 NOTE — PROGRESS NOTE ADULT - ASSESSMENT
A/P: 77 y/o M w PMH mild dementia, Afib on Flecainide (no AC), urinary incontinence, HTN a/w R IT Fx pend   medical clearance for OR today      DVT ppx-IPC  Bedrest  NPO except meds  Only hold Flecainide for HR <50 as per Dr. Pizano  Check EKG STAT  Pain management prn w minimal narcotics  FU Medical clearance (Dr. Pizano in attendance)      FAHAD Ocampo  Orthopedic Surgery  140/133

## 2024-07-27 NOTE — PHYSICAL THERAPY INITIAL EVALUATION ADULT - PERTINENT HX OF CURRENT PROBLEM, REHAB EVAL
78yMale w/ HTN, HLD, AFib (no AC), mild dementia AO x 1-2, urinary incontinence c/o R hip pain s/p mechanical fall into pool earlier Unable to bear weight in the RLE since the fall. At baseline, community ambulator w/o assistive devices. Patient and his partner are visiting from [place]. now s/p R Hip IM NAIL. after a an R hip IT Fx

## 2024-07-27 NOTE — CONSULT NOTE ADULT - SUBJECTIVE AND OBJECTIVE BOX
78-year-old male past medical history of hypertension, hyperlipidemia presents ED status post mechanical fall on right hip.  Patient was outside on the pool, tripped onto 1 step struck right hip now with persistent right hip pain.  Denies head strike, no LOC, not on any AC, was not ambulatory afterwards.  Did not strike anything else.  Denies fevers chills nausea vomiting diarrhea chest pain shortness of breath abdominal pain dysuria hematuria.  Denies prodromal symptoms. Patient was brought to [facility] for further evaluation and treatment. In the ED he was found to have a right hip fracture and is scheduled for an Open reduction and internal fixation of the right. Patient seen now resting comfortably    PAST MEDICAL & SURGICAL HISTORY:  PAF  dementia  wrist fracture  cataract extraction  urolift  double vision      MEDICATIONS  (STANDING):  acetaminophen     Tablet .. 975 milliGRAM(s) Oral every 8 hours  buPROPion XL (24-Hour) . 150 milliGRAM(s) Oral daily  donepezil 5 milliGRAM(s) Oral <User Schedule>  flecainide 50 milliGRAM(s) Oral two times a day  fludroCORTISONE 0.1 milliGRAM(s) Oral daily  melatonin 3 milliGRAM(s) Oral at bedtime  memantine 10 milliGRAM(s) Oral two times a day  pantoprazole    Tablet 40 milliGRAM(s) Oral before breakfast  senna 2 Tablet(s) Oral at bedtime  sodium chloride 0.9%. 1000 milliLiter(s) (125 mL/Hr) IV Continuous <Continuous>    MEDICATIONS  (PRN):  OLANZapine Injectable 2.5 milliGRAM(s) IntraMuscular once PRN hyperactive delirium  oxyCODONE    IR 5 milliGRAM(s) Oral every 4 hours PRN Moderate Pain (4 - 6)  oxyCODONE    IR 10 milliGRAM(s) Oral every 4 hours PRN Severe Pain (7 - 10)    Social Hx:  Tobacco: neg  ETOH: neg  Drugs:  neg    Family Hx:  As per my conversation with the patient, non contributory       ROS  CONSTITUTIONAL: No weakness, fevers or chills  EYES/ENT: Hx of double vision  No vertigo or throat pain   NECK: No pain or stiffness  RESPIRATORY: No cough, wheezing, hemoptysis; No shortness of breath  CARDIOVASCULAR: No chest pain or palpitations  GASTROINTESTINAL: No abdominal or epigastric pain. No nausea, vomiting, or hematemesis; No diarrhea or constipation. No melena or hematochezia.  GENITOURINARY: No dysuria, frequency or hematuria  NEUROLOGICAL: No numbness or weakness  SKIN: No itching, burning, rashes, or lesions   MUSCULOSKELETAL: right leg pain    INTERVAL HPI/OVERNIGHT EVENTS:  T(C): 36.7 (07-27-24 @ 05:04), Max: 37.2 (07-26-24 @ 21:58)  HR: 57 (07-27-24 @ 05:04) (57 - 88)  BP: 136/86 (07-27-24 @ 05:04) (128/85 - 148/85)  RR: 18 (07-27-24 @ 05:04) (18 - 20)  SpO2: 95% (07-27-24 @ 05:04) (95% - 98%)  Wt(kg): --  I&O's Summary    26 Jul 2024 07:01  -  27 Jul 2024 07:00  --------------------------------------------------------  IN: 0 mL / OUT: 200 mL / NET: -200 mL        PHYSICAL EXAM:  GENERAL: NAD, well-groomed, well-developed  HEAD:  Atraumatic, Normocephalic  EYES: EOMI, PERRLA, conjunctiva and sclera clear  ENMT: No tonsillar erythema, exudates, or enlargement; Moist mucous membranes, Good dentition, No lesions  NECK: Supple, No JVD, Normal thyroid  NERVOUS SYSTEM:  Alert & Oriented X2-3,Motor Strength 5/5 B/L upper and lower extremities; DTRs 2+ intact and symmetric  CHEST/LUNG: Clear to percussion bilaterally; No rales, rhonchi, wheezing, or rubs  HEART: Regular rate and rhythm; No murmurs, rubs, or gallops  ABDOMEN: Soft, Nontender, Nondistended; Bowel sounds present  EXTREMITIES:  2+ Peripheral Pulses, No clubbing, cyanosis, or edema  LYMPH: No lymphadenopathy noted  SKIN: No rashes or lesions        LABS:                        13.6   8.28  )-----------( 152      ( 27 Jul 2024 00:56 )             42.1     07-27    141  |  106  |  20  ----------------------------<  130<H>  3.5   |  24  |  1.11    Ca    9.2      27 Jul 2024 00:56    TPro  6.5  /  Alb  4.2  /  TBili  0.3  /  DBili  x   /  AST  18  /  ALT  21  /  AlkPhos  61  07-26    PT/INR - ( 27 Jul 2024 00:56 )   PT: 11.6 sec;   INR: 1.11 ratio         PTT - ( 27 Jul 2024 00:56 )  PTT:30.1 sec  Urinalysis Basic - ( 27 Jul 2024 00:56 )    Color: x / Appearance: x / SG: x / pH: x  Gluc: 130 mg/dL / Ketone: x  / Bili: x / Urobili: x   Blood: x / Protein: x / Nitrite: x   Leuk Esterase: x / RBC: x / WBC x   Sq Epi: x / Non Sq Epi: x / Bacteria: x      CAPILLARY BLOOD GLUCOSE            Urinalysis Basic - ( 27 Jul 2024 00:56 )    Color: x / Appearance: x / SG: x / pH: x  Gluc: 130 mg/dL / Ketone: x  / Bili: x / Urobili: x   Blood: x / Protein: x / Nitrite: x   Leuk Esterase: x / RBC: x / WBC x   Sq Epi: x / Non Sq Epi: x / Bacteria:     EKG pending

## 2024-07-27 NOTE — PHYSICAL THERAPY INITIAL EVALUATION ADULT - ADDITIONAL COMMENTS
as per famiy at bedside. Normally lives in a  in [place] with wife and is ambulatory with no AD> has shuffling gait at baseline. Pt is visiting [place] when this current injury occurred.

## 2024-07-27 NOTE — PROGRESS NOTE ADULT - SUBJECTIVE AND OBJECTIVE BOX
ORTHO ATTENDING POST OP    S/P IM FIXATION  R  HIP  WBAT  R  LE  Eliquis 2.5 BID  venodynes  ancef 1 g x 24 h  OOB to chair in AM  rehab consult   f/u medicine  CBC in RR and AM

## 2024-07-27 NOTE — PHYSICAL THERAPY INITIAL EVALUATION ADULT - MANUAL MUSCLE TESTING RESULTS, REHAB EVAL
[address] [place] [address] CLARISA Betts MD  [phone]      2023    Colonoscopy Procedure Note  Bhavani Valdes  :  1949  [facility] Medical Record Number: [id number]    Indications:     Ulcerative colitis  PCP:  Teto Hayes MD  Anesthesia/Sedation: Conscious Sedation/Moderate Sedation/GETA, see notes  Endoscopist:  Dr. Deo Petty  Complications:  None  Estimated Blood Loss:  None    Permit:  The indications, risks, benefits and alternatives were reviewed with the patient or their decision maker who was provided an opportunity to ask questions and all questions were answered. The specific risks of colonoscopy with conscious sedation were reviewed, including but not limited to anesthetic complication, bleeding, adverse drug reaction, missed lesion, infection, IV site reactions, and intestinal perforation which would lead to the need for surgical repair. Alternatives to colonoscopy including radiographic imaging, observation without testing, or laboratory testing were reviewed including the limitations of those alternatives. After considering the options and having all their questions answered, the patient or their decision maker provided both verbal and written consent to proceed. Procedure in Detail:  After obtaining informed consent, positioning of the patient in the left lateral decubitus position, and conduction of a pre-procedure pause or \"time out\" the endoscope was introduced into the anus and advanced to the cecum, which was identified by the ileocecal valve and appendiceal orifice. The quality of the colonic preparation was good. A careful inspection was made as the colonoscope was withdrawn, findings and interventions are described below. Findings:   Terminal ileum intubated, normal.  Left sided scarring, cicatrix and a few sigmoid pseudopolyps.   Inflammatory appearing grossly 3/5 t/o extremities/grossly assessed due to

## 2024-07-27 NOTE — PRE-ANESTHESIA EVALUATION ADULT - NSANTHADDINFOFT_GEN_ALL_CORE
Risks and benefits of anesthesia discussed with patient - including headache, nausea/vomiting, sore throat, dental injury, and cardiopulmonary complications. All questions were answered. All concerns were addressed.

## 2024-07-27 NOTE — PROGRESS NOTE ADULT - SUBJECTIVE AND OBJECTIVE BOX
Patient resting without complaints.  No chest pain, SOB, N/V.    T(C): 36.7 (07-27-24 @ 05:04), Max: 37.2 (07-26-24 @ 21:58)  HR: 57 (07-27-24 @ 05:04) (57 - 88)  BP: 136/86 (07-27-24 @ 05:04) (128/85 - 148/85)  RR: 18 (07-27-24 @ 05:04) (18 - 20)  SpO2: 95% (07-27-24 @ 05:04) (95% - 98%)      Exam:  Gen: Alert and Oriented, Answers questions appropriately, pleasant, follows commands  Lower Extremities:  RLE: skin intact, no ecchymosis, compartments soft and compressible, dull sensation grossly intact,  +DF/PF, DP2+, Toes warm and mobile w brisk cap refill  Calves Soft, Non-tender bilaterally    Labs:                          13.6   8.28  )-----------( 152      ( 27 Jul 2024 00:56 )             42.1    07-27    141  |  106  |  20  ----------------------------<  130<H>  3.5   |  24  |  1.11    Ca    9.2      27 Jul 2024 00:56    TPro  6.5  /  Alb  4.2  /  TBili  0.3  /  DBili  x   /  AST  18  /  ALT  21  /  AlkPhos  61  07-26

## 2024-07-27 NOTE — CONSULT NOTE ADULT - ASSESSMENT
77 yo male present after a fall with a right hip fracture. Patient is scheduled for an Open reduction and internal fixation of the right hip

## 2024-07-27 NOTE — CHART NOTE - NSCHARTNOTEFT_GEN_A_CORE
Ortho PA POC Note    Patient seen in PACU resting without complaints.    Denies incisional pain.  No chest pain, SOB, N/V.      T(C): 36 (07-27-24 @ 12:15), Max: 37.2 (07-26-24 @ 21:58)  HR: 54 (07-27-24 @ 13:15) (50 - 88)  BP: 125/66 (07-27-24 @ 13:15) (100/58 - 154/89)  RR: 17 (07-27-24 @ 13:15) (16 - 20)  SpO2: 97% (07-27-24 @ 13:15) (95% - 100%)      Exam:  Gen: Awake, alert, follows commands, answers questions appropriately  Lower Extremities:  RLE: Gauze/Tegaderm DSG C/D/I, compartments soft and compressible, dull sensation grossly intact,  +PF/DF/EHL/FHL, 2+DP Pulse  Calves Soft, Non-tender bilaterally w IPC in place    Postop Labs:                          12.9   8.94  )-----------( 132      ( 27 Jul 2024 12:32 )             39.0    07-27    140  |  107  |  15  ----------------------------<  113<H>  4.0   |  20<L>  |  0.96    Ca    8.2<L>      27 Jul 2024 12:32    TPro  6.5  /  Alb  4.2  /  TBili  0.3  /  DBili  x   /  AST  18  /  ALT  21  /  AlkPhos  61  07-26    A/P: 79 y/o M w PMH mild dementia, Afib on Flecainide (no AC), urinary incontinence, HTN s/p R IM Nail    Appreciate Medicine Team Recommendations  DVT ppx-IPC, Eliquis 2.5mg PO BID starting 7/28/24  Only hold Flecainide for HR <50 as per Dr. Harinder CUTLER WBAT  PT  OT  Pain management w Ofirmev X 24hours  Home meds  Gi ppx  Check labs in AM      FAHAD Ocampo  Orthopedic Surgery  [id number]

## 2024-07-28 LAB
ANION GAP SERPL CALC-SCNC: 13 MMOL/L — SIGNIFICANT CHANGE UP (ref 5–17)
BUN SERPL-MCNC: 16 MG/DL — SIGNIFICANT CHANGE UP (ref 7–23)
CALCIUM SERPL-MCNC: 9.2 MG/DL — SIGNIFICANT CHANGE UP (ref 8.4–10.5)
CHLORIDE SERPL-SCNC: 105 MMOL/L — SIGNIFICANT CHANGE UP (ref 96–108)
CO2 SERPL-SCNC: 23 MMOL/L — SIGNIFICANT CHANGE UP (ref 22–31)
CREAT SERPL-MCNC: 1.06 MG/DL — SIGNIFICANT CHANGE UP (ref 0.5–1.3)
EGFR: 73 ML/MIN/1.73M2 — SIGNIFICANT CHANGE UP
GLUCOSE BLDC GLUCOMTR-MCNC: 93 MG/DL — SIGNIFICANT CHANGE UP (ref 70–99)
GLUCOSE SERPL-MCNC: 103 MG/DL — HIGH (ref 70–99)
HCT VFR BLD CALC: 43.3 % — SIGNIFICANT CHANGE UP (ref 39–50)
HGB BLD-MCNC: 14.1 G/DL — SIGNIFICANT CHANGE UP (ref 13–17)
MCHC RBC-ENTMCNC: 32 PG — SIGNIFICANT CHANGE UP (ref 27–34)
MCHC RBC-ENTMCNC: 32.6 GM/DL — SIGNIFICANT CHANGE UP (ref 32–36)
MCV RBC AUTO: 98.2 FL — SIGNIFICANT CHANGE UP (ref 80–100)
NRBC # BLD: 0 /100 WBCS — SIGNIFICANT CHANGE UP (ref 0–0)
PLATELET # BLD AUTO: 110 K/UL — LOW (ref 150–400)
POTASSIUM SERPL-MCNC: 3.9 MMOL/L — SIGNIFICANT CHANGE UP (ref 3.5–5.3)
POTASSIUM SERPL-SCNC: 3.9 MMOL/L — SIGNIFICANT CHANGE UP (ref 3.5–5.3)
RBC # BLD: 4.41 M/UL — SIGNIFICANT CHANGE UP (ref 4.2–5.8)
RBC # FLD: 12.2 % — SIGNIFICANT CHANGE UP (ref 10.3–14.5)
SODIUM SERPL-SCNC: 141 MMOL/L — SIGNIFICANT CHANGE UP (ref 135–145)
WBC # BLD: 10.94 K/UL — HIGH (ref 3.8–10.5)
WBC # FLD AUTO: 10.94 K/UL — HIGH (ref 3.8–10.5)

## 2024-07-28 RX ORDER — GABAPENTIN 400 MG/1
300 CAPSULE ORAL AT BEDTIME
Refills: 0 | Status: DISCONTINUED | OUTPATIENT
Start: 2024-07-28 | End: 2024-07-29

## 2024-07-28 RX ORDER — LORATADINE 10 MG
17 TABLET,DISINTEGRATING ORAL DAILY
Refills: 0 | Status: DISCONTINUED | OUTPATIENT
Start: 2024-07-28 | End: 2024-08-06

## 2024-07-28 RX ORDER — ACETAMINOPHEN 500 MG
1000 TABLET ORAL ONCE
Refills: 0 | Status: COMPLETED | OUTPATIENT
Start: 2024-07-28 | End: 2024-07-28

## 2024-07-28 RX ADMIN — MEMANTINE HYDROCHLORIDE 10 MILLIGRAM(S): 14 CAPSULE, EXTENDED RELEASE ORAL at 06:23

## 2024-07-28 RX ADMIN — APIXABAN 2.5 MILLIGRAM(S): 5 TABLET, FILM COATED ORAL at 18:40

## 2024-07-28 RX ADMIN — Medication 400 MILLIGRAM(S): at 11:46

## 2024-07-28 RX ADMIN — Medication 1000 MILLIGRAM(S): at 22:32

## 2024-07-28 RX ADMIN — Medication 2.5 MILLIGRAM(S): at 02:27

## 2024-07-28 RX ADMIN — Medication 5 MILLIGRAM(S): at 18:40

## 2024-07-28 RX ADMIN — APIXABAN 2.5 MILLIGRAM(S): 5 TABLET, FILM COATED ORAL at 06:23

## 2024-07-28 RX ADMIN — Medication 5 MILLIGRAM(S): at 11:49

## 2024-07-28 RX ADMIN — FLUDROCORTISONE ACETATE 0.1 MILLIGRAM(S): 0.1 TABLET ORAL at 06:23

## 2024-07-28 RX ADMIN — Medication 1000 MILLIGRAM(S): at 12:16

## 2024-07-28 RX ADMIN — Medication 1000 MILLIGRAM(S): at 02:53

## 2024-07-28 RX ADMIN — FLECAINIDE ACETATE 50 MILLIGRAM(S): 100 TABLET ORAL at 06:23

## 2024-07-28 RX ADMIN — SENNOSIDES 2 TABLET(S): 8.6 TABLET ORAL at 21:37

## 2024-07-28 RX ADMIN — GABAPENTIN 300 MILLIGRAM(S): 400 CAPSULE ORAL at 21:37

## 2024-07-28 RX ADMIN — Medication 400 MILLIGRAM(S): at 21:36

## 2024-07-28 RX ADMIN — Medication 400 MILLIGRAM(S): at 02:23

## 2024-07-28 RX ADMIN — Medication 100 MILLIGRAM(S): at 02:51

## 2024-07-28 NOTE — OCCUPATIONAL THERAPY INITIAL EVALUATION ADULT - NS ASR FOLLOW COMMAND OT EVAL
50% of the time/able to follow single-step instructions 25% of the time/able to follow single-step instructions

## 2024-07-28 NOTE — OCCUPATIONAL THERAPY INITIAL EVALUATION ADULT - ADL RETRAINING, OT EVAL
Pt will complete lower body dressing with min A and AD as needed within 4 weeks. GOAL: Pt will complete lower body dressing with min A and AD as needed within 4 weeks.

## 2024-07-28 NOTE — CONSULT NOTE ADULT - TIME BILLING
Chart review, exam, counseling, coordination of care
Discussed treatment plan with staff and patient at bedside.

## 2024-07-28 NOTE — CONSULT NOTE ADULT - ASSESSMENT
ASSESSMENT   Patient ENEDINA VAZQUEZ is a 78y (1945) man with a PMHx significant for HTN, HLD , Afib on flecainide, dementia ( ?LBD with autonomic instability) POD1 R. hip IMN. Neurology was consulted by orthopedics for altered mental status post surgery. Pt lethargic with confusion and visual hallucinations and restlessness. No lateralizing or localizing neurological findings. Pupils constricted B/L but equally reactive, no opiods administered as per 24 hour chart review.     IMPRESSION   Altered mental status post surgery with confusion, visual hallucinations and non-focal neurological findings. Possibly delirium 2/2 surgery, pain in setting of underlying dementia. Low suspicion for acute stroke or seizures.     RECOMMENDATION   -Recommend to check: TSH, T4, B12, folate, UA, Vit D- 25OH, B1, ammonia  -Consider head CT non-contrast  -Avoid benzos. opioids (if possible) and anticholinergic as it may worsen confusion  -Maintain sleep wake cycle  -Provide frequent reorientation and redirection  -Family member at bedside if possible  -Given history of LBD vs parkinson's disease, would recommend seroquel for agitation over olanzapine and BZD    To be seen by attending and team in AM. Note finalised upon attending attestation.    ASSESSMENT   Patient ENEDINA VAZQUEZ is a 78y (1945) man with a PMHx significant for HTN, HLD , Afib on flecainide, dementia ( ?LBD with autonomic instability) POD1 R. hip IMN. Neurology was consulted by orthopedics for altered mental status post surgery. Pt lethargic with confusion and visual hallucinations and restlessness. No lateralizing or localizing neurological findings. Pupils constricted B/L but equally reactive,  opiods administered in paula-operative period yesterday.     IMPRESSION   Altered mental status post surgery with confusion, visual hallucinations and non-focal neurological findings. Possibly delirium 2/2 surgery, pain in setting of underlying dementia. Low suspicion for acute stroke or seizures.     RECOMMENDATION   -Recommend to check: TSH, T4, B12, folate, UA, Vit D- 25OH, B1, ammonia  -Consider head CT non-contrast  -Avoid benzos. opioids (if possible) and anticholinergic as it may worsen confusion  -Maintain sleep wake cycle  -Provide frequent reorientation and redirection  -Family member at bedside if possible  -Given history of LBD vs parkinson's disease, would recommend seroquel for agitation over olanzapine and BZD    To be seen by attending and team in AM. Note finalised upon attending attestation.

## 2024-07-28 NOTE — PROGRESS NOTE ADULT - SUBJECTIVE AND OBJECTIVE BOX
78-year-old male past medical history of hypertension, hyperlipidemia presents ED status post mechanical fall on right hip.  Patient was outside on the pool, tripped onto 1 step struck right hip now with persistent right hip pain.  Denies head strike, no LOC, not on any AC, was not ambulatory afterwards.  Did not strike anything else.  Denies fevers chills nausea vomiting diarrhea chest pain shortness of breath abdominal pain dysuria hematuria.  Denies prodromal symptoms. Patient was brought to [facility] for further evaluation and treatment. In the ED he was found to have a right hip fracture and is s/p  an Open reduction and internal fixation of the right. Patient seen now with increased confusion.       MEDICATIONS  (STANDING):  acetaminophen     Tablet .. 1000 milliGRAM(s) Oral every 8 hours  apixaban 2.5 milliGRAM(s) Oral every 12 hours  buPROPion XL (24-Hour) . 150 milliGRAM(s) Oral daily  donepezil 5 milliGRAM(s) Oral <User Schedule>  flecainide 50 milliGRAM(s) Oral two times a day  fludroCORTISONE 0.1 milliGRAM(s) Oral daily  gabapentin 300 milliGRAM(s) Oral at bedtime  memantine 10 milliGRAM(s) Oral two times a day  pantoprazole    Tablet 40 milliGRAM(s) Oral before breakfast  senna 2 Tablet(s) Oral at bedtime    MEDICATIONS  (PRN):          VITALS:   T(C): 36.4 (07-28-24 @ 12:25), Max: 36.9 (07-28-24 @ 00:59)  HR: 67 (07-28-24 @ 12:25) (64 - 79)  BP: 105/73 (07-28-24 @ 12:25) (105/71 - 128/83)  RR: 18 (07-28-24 @ 12:25) (18 - 18)  SpO2: 94% (07-28-24 @ 12:25) (91% - 95%)  Wt(kg): --    PHYSICAL EXAM:  GENERAL: NAD, well-groomed, well-developed  HEAD:  Atraumatic, Normocephalic  EYES: EOMI, PERRLA, conjunctiva and sclera clear  ENMT: No tonsillar erythema, exudates, or enlargement; Moist mucous membranes, Good dentition, No lesions  NECK: Supple, No JVD, Normal thyroid  NERVOUS SYSTEM:  Alert & Oriented X2-3,Motor Strength 5/5 B/L upper and lower extremities; DTRs 2+ intact and symmetric  CHEST/LUNG: Clear to percussion bilaterally; No rales, rhonchi, wheezing, or rubs  HEART: Regular rate and rhythm; No murmurs, rubs, or gallops  ABDOMEN: Soft, Nontender, Nondistended; Bowel sounds present  EXTREMITIES:  2+ Peripheral Pulses, No clubbing, cyanosis, or edema  LYMPH: No lymphadenopathy noted  SKIN: No rashes or lesions    LABS:        CBC Full  -  ( 28 Jul 2024 07:07 )  WBC Count : 10.94 K/uL  RBC Count : 4.41 M/uL  Hemoglobin : 14.1 g/dL  Hematocrit : 43.3 %  Platelet Count - Automated : 110 K/uL  Mean Cell Volume : 98.2 fl  Mean Cell Hemoglobin : 32.0 pg  Mean Cell Hemoglobin Concentration : 32.6 gm/dL  Auto Neutrophil # : x  Auto Lymphocyte # : x  Auto Monocyte # : x  Auto Eosinophil # : x  Auto Basophil # : x  Auto Neutrophil % : x  Auto Lymphocyte % : x  Auto Monocyte % : x  Auto Eosinophil % : x  Auto Basophil % : x    07-28    141  |  105  |  16  ----------------------------<  103<H>  3.9   |  23  |  1.06    Ca    9.2      28 Jul 2024 07:07    TPro  6.5  /  Alb  4.2  /  TBili  0.3  /  DBili  x   /  AST  18  /  ALT  21  /  AlkPhos  61  07-26    LIVER FUNCTIONS - ( 26 Jul 2024 19:15 )  Alb: 4.2 g/dL / Pro: 6.5 g/dL / ALK PHOS: 61 U/L / ALT: 21 U/L / AST: 18 U/L / GGT: x           PT/INR - ( 27 Jul 2024 00:56 )   PT: 11.6 sec;   INR: 1.11 ratio         PTT - ( 27 Jul 2024 00:56 )  PTT:30.1 sec  Urinalysis Basic - ( 28 Jul 2024 07:07 )    Color: x / Appearance: x / SG: x / pH: x  Gluc: 103 mg/dL / Ketone: x  / Bili: x / Urobili: x   Blood: x / Protein: x / Nitrite: x   Leuk Esterase: x / RBC: x / WBC x   Sq Epi: x / Non Sq Epi: x / Bacteria: x      CAPILLARY BLOOD GLUCOSE      POCT Blood Glucose.: 93 mg/dL (28 Jul 2024 06:48)      RADIOLOGY & ADDITIONAL TESTS:

## 2024-07-28 NOTE — PROGRESS NOTE ADULT - SUBJECTIVE AND OBJECTIVE BOX
Post op Day [1]    Patient seen bedside, confused most likely 2/2 sun downing.  Rapid response called by nursing staff 2/2 change in altered mental status.  No chest pain, SOB, N/V.    T(C): 36.6 (07-28-24 @ 05:22), Max: 36.9 (07-28-24 @ 00:59)  HR: 70 (07-28-24 @ 05:22) (50 - 79)  BP: 122/78 (07-28-24 @ 05:22) (100/58 - 154/89)  RR: 18 (07-28-24 @ 05:22) (16 - 18)  SpO2: 93% (07-28-24 @ 05:22) (91% - 100%)      Exam:  Alert and Oriented x1 (Name),  Patient most likely sundowning. No Acute Distress    Lower Extremities: R Hip  Dressing: C/D/I w/ gauze and tegaderm  Calves Soft, Non-tender bilaterally  +PF/DF/EHL/FHL  SILT  +DP Pulse                            14.1   10.94 )-----------( 110      ( 28 Jul 2024 07:07 )             43.3    07-28    141  |  105  |  16  ----------------------------<  103<H>  3.9   |  23  |  1.06    Ca    9.2      28 Jul 2024 07:07    TPro  6.5  /  Alb  4.2  /  TBili  0.3  /  DBili  x   /  AST  18  /  ALT  21  /  AlkPhos  61  07-26       Post op Day [1]    Patient seen bedside, confused most likely 2/2 sun downing.  Rapid response called by nursing staff 2/2 change in altered mental status.  No chest pain, SOB, N/V.    T(C): 36.6 (07-28-24 @ 05:22), Max: 36.9 (07-28-24 @ 00:59)  HR: 70 (07-28-24 @ 05:22) (50 - 79)  BP: 122/78 (07-28-24 @ 05:22) (100/58 - 154/89)  RR: 18 (07-28-24 @ 05:22) (16 - 18)  SpO2: 93% (07-28-24 @ 05:22) (91% - 100%)      Exam:  Alert and Oriented x1 (Name),  Patient most likely sundowning. No Acute Distress    Lower Extremities: R Hip  Dressing: Proximal C/D/I w/ gauze and Tegaderm and distal mild to mod serosanguinous saturation.   Calves Soft, Non-tender bilaterally  +PF/DF/EHL/FHL  SILT  +DP Pulse                            14.1   10.94 )-----------( 110      ( 28 Jul 2024 07:07 )             43.3    07-28    141  |  105  |  16  ----------------------------<  103<H>  3.9   |  23  |  1.06    Ca    9.2      28 Jul 2024 07:07    TPro  6.5  /  Alb  4.2  /  TBili  0.3  /  DBili  x   /  AST  18  /  ALT  21  /  AlkPhos  61  07-26

## 2024-07-28 NOTE — CONSULT NOTE ADULT - SUBJECTIVE AND OBJECTIVE BOX
Neurology - Consult Note    -  Spectra: [phone] ([facility]), [phone] ([facility])  -    HPI: Patient ENEDINA VAZQUEZ is a 78y (1945) man with a PMHx significant for       Review of Systems:  INCOMPLETE   CONSTITUTIONAL: No fevers or chills  EYES AND ENT: No visual changes or no throat pain   NECK: No pain or stiffness  RESPIRATORY: No hemoptysis or shortness of breath  CARDIOVASCULAR: No chest pain or palpitations  GASTROINTESTINAL: No melena or hematochezia  GENITOURINARY: No dysuria or hematuria  NEUROLOGICAL: +As stated in HPI above  SKIN: No itching, burning, rashes, or lesions   All other review of systems is negative unless indicated above.    Allergies:  No Known Allergies      PMHx/PSHx/Family Hx: As above, otherwise see below       Social Hx:  No current use of tobacco, alcohol, or illicit drugs  Lives with ***    Medications:  MEDICATIONS  (STANDING):  acetaminophen     Tablet .. 1000 milliGRAM(s) Oral every 8 hours  apixaban 2.5 milliGRAM(s) Oral every 12 hours  buPROPion XL (24-Hour) . 150 milliGRAM(s) Oral daily  donepezil 5 milliGRAM(s) Oral <User Schedule>  flecainide 50 milliGRAM(s) Oral two times a day  fludroCORTISONE 0.1 milliGRAM(s) Oral daily  gabapentin 300 milliGRAM(s) Oral at bedtime  memantine 10 milliGRAM(s) Oral two times a day  pantoprazole    Tablet 40 milliGRAM(s) Oral before breakfast  senna 2 Tablet(s) Oral at bedtime    MEDICATIONS  (PRN):      Vitals:  T(C): 36.4 (07-28-24 @ 12:25), Max: 36.9 (07-28-24 @ 00:59)  HR: 67 (07-28-24 @ 12:25) (64 - 79)  BP: 105/73 (07-28-24 @ 12:25) (105/71 - 128/83)  RR: 18 (07-28-24 @ 12:25) (18 - 18)  SpO2: 94% (07-28-24 @ 12:25) (91% - 95%)    Physical Examination: INCOMPLETE  General - NAD, pleasant, cooperative   Cardiovascular - Peripheral pulses palpable, no edema  Neurologic Exam:  Mental status - Awake, Alert, Oriented to person, place, and time. Speech fluent, repetition and naming intact. Follows simple and complex commands. Attention/concentration, recent and remote memory (including registration and recall), and fund of knowledge intact    Cranial nerves:  CN II: Visual fields are full to confrontation. Fundoscopic exam is normal with sharp discs. Pupils are 4 mm and briskly reactive to light. Visual acuity is 20/20 bilaterally.  CN III, IV, VI: EOMI, no nystagmus, no ptosis  CN V: Facial sensation is intact to pinprick in all 3 divisions bilaterally.  CN VII: Face is symmetric with normal eye closure and smile.  CN VII: Hearing is normal to rubbing fingers  CN IX, X: Palate elevates symmetrically. Phonation is normal.  CN XI: Head turning and shoulder shrug are intact  CN XII: Tongue is midline with normal movements and no atrophy.    Motor - Normal bulk and tone throughout. No pronator drift of out-stretched arms.  Strength testing            Deltoid(C5)  Biceps(C6)    Triceps(C7)     Wrist Extension    Wrist Flexion (C8)     Interossei  (T1)       R            5                 5                        5                     5                              5                                      5                         5  L             5                 5                        5                     5                              5                                      5                          5              Hip Flexion(L2/3)    Hip Extension (L4/5)   Knee Flexion (L4/5/S1)    Knee Extension (L3/4)   Dorsiflexion (L4/5)   Plantar Flexion (S1)  R              5                                    5                                     5                                                     5                                              5                          5  L              5                                     5                                     5                                                     5                                              5                          5    Sensation - Light touch/temperature OR pain/vibration intact in fingers and toes     DTR's -             Biceps      Triceps     Brachioradialis      Patellar    Ankle    Toes/plantar response  R             2+             2+                  2+                       2+            2+                 Down  L              2+             2+                 2+                        2+           2+                 Down    Coordination - Rapid alternating movements and fine finger movements are intact. There is no dysmetria on finger-to-nose and heel-knee-shin. There are no abnormal or extraneous movements. Romberg?    Gait and station - Posture is normal. Gait is steady with normal steps, base, arm swing, and turning. Heel and toe walking are normal. Tandem gait is normal       Labs:                        14.1   10.94 )-----------( 110      ( 28 Jul 2024 07:07 )             43.3     07-28    141  |  105  |  16  ----------------------------<  103<H>  3.9   |  23  |  1.06    Ca    9.2      28 Jul 2024 07:07    TPro  6.5  /  Alb  4.2  /  TBili  0.3  /  DBili  x   /  AST  18  /  ALT  21  /  AlkPhos  61  07-26    CAPILLARY BLOOD GLUCOSE      POCT Blood Glucose.: 93 mg/dL (28 Jul 2024 06:48)    LIVER FUNCTIONS - ( 26 Jul 2024 19:15 )  Alb: 4.2 g/dL / Pro: 6.5 g/dL / ALK PHOS: 61 U/L / ALT: 21 U/L / AST: 18 U/L / GGT: x             PT/INR - ( 27 Jul 2024 00:56 )   PT: 11.6 sec;   INR: 1.11 ratio         PTT - ( 27 Jul 2024 00:56 )  PTT:30.1 sec  CSF:                  Radiology:      78 with HTN HLLD, afib, dementia (?LBD with autonomic features), urinary incontinence POD1 R. hip IMN evaluated for  Neurology - Consult Note  -  Spectra: [phone] ([facility]), [phone] ([facility])  -  HPI: Patient ENEDINA VAZQUEZ is a 78y (1945) man with a PMHx significant for HTN, HLD , Afib on flecainide, dementia ( ?LBD with autonomic instability) POD1 R. hip IMN. Neurology was consulted by orthopedics for altered mental status post surgery. Wife at bedside reports that patient was very agitated and confused after coming out of anesthesia last night and was given olanzapine overnight for agitation. A rapid response was called in the AM for change in mentation from baseline (ambulatory, A OX2-3 as per wife) where he was having visual hallucinations, grabbing at air and was very confused. At the time of evaluation pt was lethargic and confused, he seemed to be having hallucinations and was talking into the air. He was able to follow simple commands and had brief periods of lucidity where he spoke about the RRT in the morning and was spontaneously moving his limbs. According to chart review, pt was just receiving IV tylenol for pain management.     Of note patient has autonomic instability at baseline takes fludrocortisone, wife reports short shuffling steps but no tremors diagnosis of LBD vs IPD unclear. They are originally from [place], patient is a retired medical doctor by profession.     Remainder ROS could not be assessed due to existing patient factors.      Allergies:  No Known Allergies      PMHx/PSHx/Family Hx: As above, otherwise see below     Social Hx:  No current use of tobacco, alcohol, or illicit drugs    Medications:  MEDICATIONS  (STANDING):  acetaminophen     Tablet .. 1000 milliGRAM(s) Oral every 8 hours  apixaban 2.5 milliGRAM(s) Oral every 12 hours  buPROPion XL (24-Hour) . 150 milliGRAM(s) Oral daily  donepezil 5 milliGRAM(s) Oral <User Schedule>  flecainide 50 milliGRAM(s) Oral two times a day  fludroCORTISONE 0.1 milliGRAM(s) Oral daily  gabapentin 300 milliGRAM(s) Oral at bedtime  memantine 10 milliGRAM(s) Oral two times a day  pantoprazole    Tablet 40 milliGRAM(s) Oral before breakfast  senna 2 Tablet(s) Oral at bedtime    MEDICATIONS  (PRN):      Vitals:  T(C): 36.4 (07-28-24 @ 12:25), Max: 36.9 (07-28-24 @ 00:59)  HR: 67 (07-28-24 @ 12:25) (64 - 79)  BP: 105/73 (07-28-24 @ 12:25) (105/71 - 128/83)  RR: 18 (07-28-24 @ 12:25) (18 - 18)  SpO2: 94% (07-28-24 @ 12:25) (91% - 95%)    Physical Examination:   General -   Cardiovascular - Mild  edema  Neurologic Exam:  Mental status - Lethargic, confused, follows simple commands of wiggling fingers, toes and squeezing fingers.     Cranial nerves:  CN II:  Pupils are equal 2 mm and reactive to light.   CN III, IV, VI: No apparent EOM limitation  CN V: Closes eyes to visual threat  CN VII: Face broadly symmetric, no flattening of nasolabial fold  Remainder CN exam could not be assessed    Motor - Spontaneously moves limbs    Sensation -Withdraws to pain symmetrically    DTR's -             Biceps      Triceps     Brachioradialis      Patellar    Ankle    Toes/plantar response  R             2+             2+                  2+             2+            2+                 Down  L              2+             2+                 2+             2+           2+                 Down    Coordination - Could not be assessed due to existing patient factors    Gait and station - Could not be assessed due to existing patient factors      Labs:                        14.1   10.94 )-----------( 110      ( 28 Jul 2024 07:07 )             43.3     07-28    141  |  105  |  16  ----------------------------<  103<H>  3.9   |  23  |  1.06    Ca    9.2      28 Jul 2024 07:07    TPro  6.5  /  Alb  4.2  /  TBili  0.3  /  DBili  x   /  AST  18  /  ALT  21  /  AlkPhos  61  07-26    CAPILLARY BLOOD GLUCOSE      POCT Blood Glucose.: 93 mg/dL (28 Jul 2024 06:48)    LIVER FUNCTIONS - ( 26 Jul 2024 19:15 )  Alb: 4.2 g/dL / Pro: 6.5 g/dL / ALK PHOS: 61 U/L / ALT: 21 U/L / AST: 18 U/L / GGT: x             PT/INR - ( 27 Jul 2024 00:56 )   PT: 11.6 sec;   INR: 1.11 ratio         PTT - ( 27 Jul 2024 00:56 )  PTT:30.1 sec  CSF:

## 2024-07-28 NOTE — DISCHARGE NOTE PROVIDER - NSDCFUADDAPPT_GEN_ALL_CORE_FT
Please follow up with Dr. Villalobos in 2 weeks from surgery date.  Please call office to schedule and confirm your appointment date and time.    Please Follow up with your primary care provider in approximately one month from hospital discharge to discuss your recent surgery/admission and for continuum of care. Please follow up with Dr. Villalobos or Orthopeadist in 2 weeks from surgery date.  Please call office to schedule and confirm your appointment date and time.    Please Follow up with your primary care provider in approximately one month from hospital discharge to discuss your recent surgery/admission and for continuum of care.

## 2024-07-28 NOTE — OCCUPATIONAL THERAPY INITIAL EVALUATION ADULT - ADDITIONAL COMMENTS
Pt lives with [place] in [place] in [place]. Pt reports independence with all aspects of self care and functional mobility without AD PTA, +shuffling gait.

## 2024-07-28 NOTE — OCCUPATIONAL THERAPY INITIAL EVALUATION ADULT - PLANNED THERAPY INTERVENTIONS, OT EVAL
ADL retraining/balance training/bed mobility training/strengthening/transfer training ADL retraining/balance training/bed mobility training/cognitive, visual perceptual/strengthening/transfer training

## 2024-07-28 NOTE — DISCHARGE NOTE PROVIDER - HOSPITAL COURSE
HPI:  78yMale w/ HTN, HLD, AFib (no AC), mild dementia, urinary incontinence c/o R hip pain s/p mechanical fall into pool earlier today. Unable to bear weight in the RLE since the fall. Denies headstrike or LOC. Denies numbness/tingling in the RLE. Denies any other trauma/injuries at this time. At baseline, community ambulator w/o assistive devices. Patient and his partner are visiting from [place].     Hospital Course:  Patient admitted to [facility] on 7/26/24 for a R Intertrochanteric Femur Fx.  Patient scheduled for a R Femur IM Nail ORIF with Dr. Villalobos on 7/27/24. Patient tolerated procedure well with no complications. Physical Therapy evaluated patient and recommended patient for disposition to Sub Acute Rehab.  Remainder of hospital stay unremarkable and patient medically cleared and stable for discharge. HPI:  78yMale w/ HTN, HLD, AFib (no AC), mild dementia, urinary incontinence c/o R hip pain s/p mechanical fall into pool earlier today. Unable to bear weight in the RLE since the fall. Denies headstrike or LOC. Denies numbness/tingling in the RLE. Denies any other trauma/injuries at this time. At baseline, community ambulator w/o assistive devices. Patient and his partner are visiting from [place].     Hospital Course:  Patient admitted to [facility] on 7/26/24 for a R Intertrochanteric Femur Fx.  Patient scheduled for a R Femur IM Nail ORIF with Dr. Villalobos on 7/27/24. Patient tolerated procedure well with no complications.     Neuro Recommendations:  -B12 low, would replete with B12 2000 mcg PO/SL daily for 3 months VERSUS 1000mcg IM daily for 1 week -> 1000mcg IM weekly for 4 weeks -> 1000mcg IM monthly for 3 months and then recheck new levels    Psych Recommendations:  -Ativan 0.25-0.5mg po/iv q6hr prn severe agitation    Physical Therapy evaluated patient and recommended patient for disposition to Sub Acute Rehab.  Remainder of hospital stay unremarkable and patient medically cleared and stable for discharge. HPI:  78yMale w/ HTN, HLD, AFib (no AC), mild dementia, urinary incontinence c/o R hip pain s/p mechanical fall into pool earlier today. Unable to bear weight in the RLE since the fall. Denies headstrike or LOC. Denies numbness/tingling in the RLE. Denies any other trauma/injuries at this time. At baseline, community ambulator w/o assistive devices. Patient and his partner are visiting from [place].     Hospital Course:  Patient admitted to [facility] on 7/26/24 for a R Intertrochanteric Femur Fx.  Patient scheduled for a R Femur IM Nail ORIF with Dr. Villalobos on 7/27/24. Patient tolerated procedure well with no complications.     Neuro Recommendations:  -B12 low, would replete with B12 2000 mcg PO/SL daily for 3 months VERSUS 1000mcg IM daily for 1 week -> 1000mcg IM weekly for 4 weeks -> 1000mcg IM monthly for 3 months and then recheck new levels    Psych Recommendations:  -Ativan 0.25-0.5mg po/iv q6hr prn severe agitation    Physical Therapy evaluated patient and recommended patient for disposition to Sub Acute Rehab.    Remainder of hospital stay unremarkable and patient medically cleared and stable for discharge.

## 2024-07-28 NOTE — DISCHARGE NOTE PROVIDER - NSDCCPTREATMENT_GEN_ALL_CORE_FT
PRINCIPAL PROCEDURE  Procedure: Placement of trochanteric nail into right hip  Findings and Treatment:

## 2024-07-28 NOTE — OCCUPATIONAL THERAPY INITIAL EVALUATION ADULT - BALANCE TRAINING, PT EVAL
Pt will improve static sitting balance by 1/2 grade to improve functional performance with ADLs within 4 weeks. GOAL: Pt will improve static sitting balance by 1 grade to improve functional performance with ADLs within 4 weeks.

## 2024-07-28 NOTE — OCCUPATIONAL THERAPY INITIAL EVALUATION ADULT - RLE MMT, REHAB EVAL
hip 3-/5, knee 3-/5, ankle 3/5 decreased command follow, atleast 3/5 as observed by spontaneous movemen/grossly assessed due to

## 2024-07-28 NOTE — DISCHARGE NOTE PROVIDER - NSDCFUADDINST_GEN_ALL_CORE_FT
Please keep dressing clean and intact, Incision/dressing care to be performed in office follow up visit.  Please continue to take your Eliquis for a total of 6 weeks for blood clot prevention.  Weight bearing as tolerated to right lower extremity Please keep dressing clean and intact, Incision/dressing care to be performed in office follow up visit.  If sutures or staples present remove POD# 14 and apply steri strips.  Please continue to take your Eliquis (Lovenox) for a total of 6 weeks for blood clot prevention.  Weight bearing as tolerated to right lower extremity

## 2024-07-28 NOTE — DISCHARGE NOTE PROVIDER - CARE PROVIDERS DIRECT ADDRESSES
[email or web address] Rhombic Flap Text: The defect edges were debeveled with a #15 scalpel blade.  Given the location of the defect and the proximity to free margins a rhombic flap was deemed most appropriate.  Using a sterile surgical marker, an appropriate rhombic flap was drawn incorporating the defect.    The area thus outlined was incised deep to adipose tissue with a #15 scalpel blade.  The skin margins were undermined to an appropriate distance in all directions utilizing iris scissors.

## 2024-07-28 NOTE — OCCUPATIONAL THERAPY INITIAL EVALUATION ADULT - LIVES WITH, PROFILE
Pt lives with spouse in [place] in [place]. Pt reports independence with all aspects of self care and functional mobility without AD PTA, +shuffling gait. 

## 2024-07-28 NOTE — OCCUPATIONAL THERAPY INITIAL EVALUATION ADULT - LEVEL OF INDEPENDENCE: SIT/STAND, REHAB EVAL
Anesthesia Post Evaluation    Patient: Stuart Cloud    Procedure(s) Performed: Procedure(s) (LRB):  BLOCK, SACROILIAC JOINT (Bilateral)    Final Anesthesia Type: general      Patient participation: Yes- Able to Participate  Level of consciousness: awake and alert  Post-procedure vital signs: reviewed and stable  Pain management: adequate  Airway patency: patent    PONV status at discharge: No PONV  Anesthetic complications: no      Cardiovascular status: blood pressure returned to baseline, hemodynamically stable and stable  Respiratory status: unassisted  Hydration status: euvolemic  Follow-up not needed.  Comments: Refer to nursing notes for pain/fernando score upon discharge from recovery              Vitals Value Taken Time   /70 05/07/24 1544   Temp 36.7 °C (98 °F) 05/07/24 1514   Pulse 82 05/07/24 1544   Resp 12 05/07/24 1514   SpO2 98 % 05/07/24 1544         Event Time   Out of Recovery 15:40:00         Pain/Fernando Score: Fernando Score: 10 (5/7/2024  3:35 PM)           unable to perform due to decreased cognitive status+ poor postural control/unable to perform

## 2024-07-28 NOTE — CONSULT NOTE ADULT - ATTENDING COMMENTS
HPI as per resident note, personally verified by me. Patient with fluctuating mental status following recent hip fracture repair surgery, pain, and not sleeping well. No focal neurologic deficits or abnormal movements noted. Wife, at bedside, reports he can get this way following medical illness or procedures. Continues to have some fluctuating mental status but overall improved.    Neurologic exam as per resident note with additions as below:  AAO x2.25 (knew it was Monday for date only), speech hypophonic but otherwise fluent  CN's II-XII intact  Strength 4+/5 all except for proximal RLE 2/5 (recent surgery)  Sens intact all  FtN intact b/l  Neutral b/l plantar response    A/P:  Encephalopathy  HTN  Atrial fibrillation on Eliquis  Lewy body dementia versus Parkinson disease  R hip fracture s/p repair  Thrombocytopenia (plt dec 110)  Leukocytosis (WBC inc 10.94)    - Etiology for above encephalopathy likely acute toxic/metabolic process in the setting of recent surgery, acute medical issues, and poor sleep superimposed on decreased cognitive reserve from baseline dementia. Due to lack of focal neurologic deficits or abnormal movements low suspicion for alternative causes such as cerebrovascular event or seizure. He is improving  - Consider CT head w/o to evaluate for acute intracranial event  - Labs as per resident note, would also add on RVP  - Can consider EEG if no improvement but low suspicion for seizure so can hold off for now  - Continue to address above medical and surgical issues, as you are doing  - Will continue to follow patient peripherally with you, please call [phone]) with additional questions or concerns

## 2024-07-28 NOTE — PROGRESS NOTE ADULT - ASSESSMENT
A/p: 78y Male s/p R Intertrochanteric Fx IM Nail ORIF.  VSS. NAD.    [facility] Response Team: Most likely delirium.  Patient with Hx of dementia and sundowning.  PT/OT-WBAT RLE  FU AM labs  IS  DVT PPx: Eliquis 2.5 mg BID, SCDs   Pain Control  Continue Current Tx.  Dispo plan sub acute rehab    Adelfo Kaplan PA-C  Orthopedic Surgery Team  Team Pager: [phone]

## 2024-07-28 NOTE — OCCUPATIONAL THERAPY INITIAL EVALUATION ADULT - LUE MMT, REHAB EVAL
3/5 decreased command follow, atleast 3/5 as observed by spontaneous movemen/grossly assessed due to

## 2024-07-28 NOTE — OCCUPATIONAL THERAPY INITIAL EVALUATION ADULT - RUE MMT, REHAB EVAL
3/5 decreased command follow, atleast 3/5 as observed by spontaneous movement/grossly assessed due to

## 2024-07-28 NOTE — OCCUPATIONAL THERAPY INITIAL EVALUATION ADULT - NSOTDISCHREC_GEN_A_CORE
Sub-acute Rehab If home, Home OT with assistance and supervision with ADL/IADLs, hospital bed, mechanical lift, poly fly wheelchair, 3-1 commode/Sub-acute Rehab

## 2024-07-28 NOTE — OCCUPATIONAL THERAPY INITIAL EVALUATION ADULT - PERTINENT HX OF CURRENT PROBLEM, REHAB EVAL
78M w/ HTN, HLD, AFib (no AC), mild dementia AO x 1-2, urinary incontinence c/o R hip pain s/p mechanical fall into pool earlier Unable to bear weight in the RLE since the fall. At baseline, community ambulator w/o assistive devices. Patient and his partner are visiting from [place]. now s/p R Hip IM NAIL on 7/27. s/p RRT 7/28 AM, RRT called for acute change in mental status. Per RN at bedside, LKN was 2:00 this morning. Reports that patient developed confusion. VSS, normal BG level. On exam, patient with no focal neurological deficits, following commands and AOx2. Bladder scan without urinary retention. Likely that patient's mental status is waxing and waning I/s/o hospital acquired delirium, especially given history of mild dementia. Primary team at bedside to re-assess mental status, recommend frequent re-orientation.

## 2024-07-28 NOTE — DISCHARGE NOTE PROVIDER - NSDCMRMEDTOKEN_GEN_ALL_CORE_FT
donepezil 5 mg oral tablet: 1 tab(s) orally once a day  flecainide 50 mg oral tablet: 1 tab(s) orally once a day  fludrocortisone 0.1 mg oral tablet: 1 tab(s) orally once a day  memantine 10 mg oral tablet: 1 tab(s) orally 2 times a day  Omega-3 Fish Oil 1000 mg oral capsule: 1 cap(s) orally once a day  pravastatin 20 mg oral tablet: 1 tab(s) orally once a day  Wellbutrin  mg/24 hours oral tablet, extended release: 1 tab(s) orally once a day   acetaminophen 500 mg oral tablet: 2 tab(s) orally every 8 hours Continue for 6 days, then as needed  apixaban 2.5 mg oral tablet: 1 tab(s) orally every 12 hours  donepezil 5 mg oral tablet: 1 tab(s) orally once a day  flecainide 50 mg oral tablet: 1 tab(s) orally once a day  fludrocortisone 0.1 mg oral tablet: 1 tab(s) orally once a day  memantine 10 mg oral tablet: 1 tab(s) orally 2 times a day  Omega-3 Fish Oil 1000 mg oral capsule: 1 cap(s) orally once a day  pantoprazole 40 mg oral delayed release tablet: 1 tab(s) orally once a day (before a meal)  polyethylene glycol 3350 oral powder for reconstitution: 17 gram(s) orally once a day as needed for  constipation  pravastatin 20 mg oral tablet: 1 tab(s) orally once a day  senna leaf extract oral tablet: 2 tab(s) orally once a day (at bedtime)  Wellbutrin  mg/24 hours oral tablet, extended release: 1 tab(s) orally once a day   acetaminophen 500 mg oral tablet: 2 tab(s) orally every 8 hours as needed for Pain/ HA/ Fever  apixaban 2.5 mg oral tablet: 1 tab(s) orally every 12 hours  donepezil 5 mg oral tablet: 1 tab(s) orally once a day  flecainide 50 mg oral tablet: 1 tab(s) orally once a day  fludrocortisone 0.1 mg oral tablet: 1 tab(s) orally once a day  memantine 10 mg oral tablet: 1 tab(s) orally 2 times a day  Multiple Vitamins oral tablet: 1 tab(s) orally once a day  Omega-3 Fish Oil 1000 mg oral capsule: 1 cap(s) orally once a day  pantoprazole 40 mg oral delayed release tablet: 1 tab(s) orally once a day (before a meal)  polyethylene glycol 3350 oral powder for reconstitution: 17 gram(s) orally once a day as needed for  constipation  pravastatin 20 mg oral tablet: 1 tab(s) orally once a day  senna leaf extract oral tablet: 2 tab(s) orally once a day (at bedtime)  vitamin D-calcium (as carbonate) 5 mcg-500 mg oral tablet: 1 tab(s) orally 2 times a day  Wellbutrin  mg/24 hours oral tablet, extended release: 1 tab(s) orally once a day   acetaminophen 500 mg oral tablet: 2 tab(s) orally every 8 hours as needed for Pain/ HA/ Fever  apixaban 2.5 mg oral tablet: 1 tab(s) orally every 12 hours May use Lovenox 40mg SQ daily,  if not tolerating PO meds 2nd to Mental Status  donepezil 5 mg oral tablet: 1 tab(s) orally once a day  flecainide 50 mg oral tablet: 1 tab(s) orally once a day  fludrocortisone 0.1 mg oral tablet: 1 tab(s) orally once a day  memantine 10 mg oral tablet: 1 tab(s) orally 2 times a day  Multiple Vitamins oral tablet: 1 tab(s) orally once a day  Omega-3 Fish Oil 1000 mg oral capsule: 1 cap(s) orally once a day  pantoprazole 40 mg oral delayed release tablet: 1 tab(s) orally once a day (before a meal)  polyethylene glycol 3350 oral powder for reconstitution: 17 gram(s) orally once a day as needed for  constipation  pravastatin 20 mg oral tablet: 1 tab(s) orally once a day  senna leaf extract oral tablet: 2 tab(s) orally once a day (at bedtime)  vitamin D-calcium (as carbonate) 5 mcg-500 mg oral tablet: 1 tab(s) orally 2 times a day  Wellbutrin  mg/24 hours oral tablet, extended release: 1 tab(s) orally once a day

## 2024-07-28 NOTE — OCCUPATIONAL THERAPY INITIAL EVALUATION ADULT - BED MOBILITY TRAINING, PT EVAL
Pt will perform supine to sit with CGA within 4 weeks. GOAL: Pt will perform supine to sit with CGA within 4 weeks.

## 2024-07-28 NOTE — OCCUPATIONAL THERAPY INITIAL EVALUATION ADULT - STRENGTHENING, PT EVAL
GOAL: Pt. will increase strength by 1/2 grade in order to increase independence with functional transfers, mobility and ADL completion

## 2024-07-28 NOTE — DISCHARGE NOTE PROVIDER - CARE PROVIDER_API CALL
Virgil Villalobos  Orthopaedic Surgery  [address]  [place] [address]  Phone: [phone]  Fax: [phone]  Follow Up Time:

## 2024-07-28 NOTE — OCCUPATIONAL THERAPY INITIAL EVALUATION ADULT - TRANSFER TRAINING, PT EVAL
Pt will complete functional toilet transfer with CGA and appropriate AD within 4 weeks. GOAL: Pt will complete functional toilet transfer with CGA and appropriate AD within 4 weeks.

## 2024-07-28 NOTE — RAPID RESPONSE TEAM SUMMARY - NSSITUATIONBACKGROUNDRRT_GEN_ALL_CORE
77 yo male present after a fall with a right hip fracture s/p Open reduction and internal fixation of the right hip.     RRT called for acute change in mental status. Per RN at bedside, LKN was 2:00 this morning. Reports that patient developed confusion. VSS, normal BG level. On exam, patient with no focal neurological deficits, following commands and AOx2. Bladder scan without urinary retention.     Likely that patient's mental status is waxing and waning I/s/o hospital acquired delirium, especially given history of mild dementia. Primary team at bedside to re-assess mental status, recommend frequent re-orientation.

## 2024-07-29 LAB
ANION GAP SERPL CALC-SCNC: 15 MMOL/L — SIGNIFICANT CHANGE UP (ref 5–17)
BLD GP AB SCN SERPL QL: NEGATIVE — SIGNIFICANT CHANGE UP
BUN SERPL-MCNC: 27 MG/DL — HIGH (ref 7–23)
CALCIUM SERPL-MCNC: 9 MG/DL — SIGNIFICANT CHANGE UP (ref 8.4–10.5)
CHLORIDE SERPL-SCNC: 103 MMOL/L — SIGNIFICANT CHANGE UP (ref 96–108)
CO2 SERPL-SCNC: 22 MMOL/L — SIGNIFICANT CHANGE UP (ref 22–31)
CREAT SERPL-MCNC: 1.26 MG/DL — SIGNIFICANT CHANGE UP (ref 0.5–1.3)
EGFR: 59 ML/MIN/1.73M2 — LOW
GLUCOSE SERPL-MCNC: 90 MG/DL — SIGNIFICANT CHANGE UP (ref 70–99)
HCT VFR BLD CALC: 40.2 % — SIGNIFICANT CHANGE UP (ref 39–50)
HGB BLD-MCNC: 13.1 G/DL — SIGNIFICANT CHANGE UP (ref 13–17)
MCHC RBC-ENTMCNC: 32.3 PG — SIGNIFICANT CHANGE UP (ref 27–34)
MCHC RBC-ENTMCNC: 32.6 GM/DL — SIGNIFICANT CHANGE UP (ref 32–36)
MCV RBC AUTO: 99 FL — SIGNIFICANT CHANGE UP (ref 80–100)
NRBC # BLD: 0 /100 WBCS — SIGNIFICANT CHANGE UP (ref 0–0)
PLATELET # BLD AUTO: 130 K/UL — LOW (ref 150–400)
POTASSIUM SERPL-MCNC: 4 MMOL/L — SIGNIFICANT CHANGE UP (ref 3.5–5.3)
POTASSIUM SERPL-SCNC: 4 MMOL/L — SIGNIFICANT CHANGE UP (ref 3.5–5.3)
RBC # BLD: 4.06 M/UL — LOW (ref 4.2–5.8)
RBC # FLD: 12.8 % — SIGNIFICANT CHANGE UP (ref 10.3–14.5)
RH IG SCN BLD-IMP: POSITIVE — SIGNIFICANT CHANGE UP
SODIUM SERPL-SCNC: 140 MMOL/L — SIGNIFICANT CHANGE UP (ref 135–145)
WBC # BLD: 7.57 K/UL — SIGNIFICANT CHANGE UP (ref 3.8–10.5)
WBC # FLD AUTO: 7.57 K/UL — SIGNIFICANT CHANGE UP (ref 3.8–10.5)

## 2024-07-29 PROCEDURE — 99221 1ST HOSP IP/OBS SF/LOW 40: CPT

## 2024-07-29 PROCEDURE — 99223 1ST HOSP IP/OBS HIGH 75: CPT | Mod: GC

## 2024-07-29 RX ORDER — APIXABAN 5 MG/1
1 TABLET, FILM COATED ORAL
Qty: 0 | Refills: 0 | DISCHARGE
Start: 2024-07-29

## 2024-07-29 RX ORDER — SENNOSIDES 8.6 MG/1
2 TABLET ORAL
Qty: 0 | Refills: 0 | DISCHARGE
Start: 2024-07-29

## 2024-07-29 RX ORDER — ACETAMINOPHEN 500 MG
2 TABLET ORAL
Qty: 0 | Refills: 0 | DISCHARGE
Start: 2024-07-29

## 2024-07-29 RX ORDER — BACTERIOSTATIC SODIUM CHLORIDE 0.9 %
1000 VIAL (ML) INJECTION
Refills: 0 | Status: DISCONTINUED | OUTPATIENT
Start: 2024-07-29 | End: 2024-07-30

## 2024-07-29 RX ORDER — GABAPENTIN 400 MG/1
100 CAPSULE ORAL AT BEDTIME
Refills: 0 | Status: DISCONTINUED | OUTPATIENT
Start: 2024-07-29 | End: 2024-07-30

## 2024-07-29 RX ORDER — LORATADINE 10 MG
17 TABLET,DISINTEGRATING ORAL
Qty: 0 | Refills: 0 | DISCHARGE
Start: 2024-07-29

## 2024-07-29 RX ORDER — ACETAMINOPHEN 500 MG
1000 TABLET ORAL ONCE
Refills: 0 | Status: COMPLETED | OUTPATIENT
Start: 2024-07-30 | End: 2024-07-30

## 2024-07-29 RX ORDER — ACETAMINOPHEN 500 MG
1000 TABLET ORAL ONCE
Refills: 0 | Status: COMPLETED | OUTPATIENT
Start: 2024-07-29 | End: 2024-07-29

## 2024-07-29 RX ORDER — PANTOPRAZOLE SODIUM 20 MG/1
1 TABLET, DELAYED RELEASE ORAL
Qty: 0 | Refills: 0 | DISCHARGE
Start: 2024-07-29

## 2024-07-29 RX ADMIN — Medication 5 MILLIGRAM(S): at 21:04

## 2024-07-29 RX ADMIN — MEMANTINE HYDROCHLORIDE 10 MILLIGRAM(S): 14 CAPSULE, EXTENDED RELEASE ORAL at 21:04

## 2024-07-29 RX ADMIN — Medication 1000 MILLIGRAM(S): at 22:05

## 2024-07-29 RX ADMIN — MEMANTINE HYDROCHLORIDE 10 MILLIGRAM(S): 14 CAPSULE, EXTENDED RELEASE ORAL at 11:37

## 2024-07-29 RX ADMIN — Medication 1000 MILLIGRAM(S): at 12:04

## 2024-07-29 RX ADMIN — FLUDROCORTISONE ACETATE 0.1 MILLIGRAM(S): 0.1 TABLET ORAL at 11:44

## 2024-07-29 RX ADMIN — FLECAINIDE ACETATE 50 MILLIGRAM(S): 100 TABLET ORAL at 11:45

## 2024-07-29 RX ADMIN — APIXABAN 2.5 MILLIGRAM(S): 5 TABLET, FILM COATED ORAL at 11:41

## 2024-07-29 RX ADMIN — APIXABAN 2.5 MILLIGRAM(S): 5 TABLET, FILM COATED ORAL at 21:04

## 2024-07-29 RX ADMIN — Medication 150 MILLIGRAM(S): at 11:43

## 2024-07-29 RX ADMIN — Medication 400 MILLIGRAM(S): at 21:05

## 2024-07-29 RX ADMIN — Medication 5 MILLIGRAM(S): at 11:36

## 2024-07-29 RX ADMIN — Medication 100 MILLILITER(S): at 12:55

## 2024-07-29 RX ADMIN — PANTOPRAZOLE SODIUM 40 MILLIGRAM(S): 20 TABLET, DELAYED RELEASE ORAL at 11:43

## 2024-07-29 RX ADMIN — Medication 17 GRAM(S): at 11:45

## 2024-07-29 RX ADMIN — Medication 400 MILLIGRAM(S): at 11:34

## 2024-07-29 NOTE — PROGRESS NOTE ADULT - SUBJECTIVE AND OBJECTIVE BOX
Patient is a 78y old  Male who presents with a chief complaint of R IT hip fracture  Patient s/p right hip fracture fixation with IM nail POD#2  Patient appears comfortable, agitated delirious overnight    T(C): 36.9 (07-28-24 @ 21:01), Max: 36.9 (07-28-24 @ 21:01)  HR: 92 (07-28-24 @ 21:01) (67 - 92)  BP: 120/88 (07-28-24 @ 21:01) (105/71 - 127/78)  RR: 18 (07-28-24 @ 21:01) (18 - 18)  SpO2: 93% (07-28-24 @ 21:01) (91% - 94%)  Wt(kg): --    PHYSICAL EXAM:  NAD, Alert  [Right  ] Hip: Dressings C/D/I; sensation grossly intact to light touch; (+) DF/PF; (+) Distal Pulses; No Calf tenderness B/L, PAS   LABS:                     14.1   10.94 )-----------( 110      ( 28 Jul 2024 07:07 )             43.3   07-28  141  |  105  |  16  ----------------------------<  103<H>  3.9   |  23  |  1.06  Ca    9.2      28 Jul 2024 07:07

## 2024-07-29 NOTE — PROGRESS NOTE ADULT - SUBJECTIVE AND OBJECTIVE BOX
78-year-old male past medical history of hypertension, hyperlipidemia presents ED status post mechanical fall on right hip.  Patient was outside on the pool, tripped onto 1 step struck right hip now with persistent right hip pain.  Denies head strike, no LOC, not on any AC, was not ambulatory afterwards.  Did not strike anything else.  Denies fevers chills nausea vomiting diarrhea chest pain shortness of breath abdominal pain dysuria hematuria.  Denies prodromal symptoms. Patient was brought to [facility] for further evaluation and treatment. In the ED he was found to have a right hip fracture and is s/p  an Open reduction and internal fixation of the right. Patient less confused today.       MEDICATIONS  (STANDING):  acetaminophen     Tablet .. 1000 milliGRAM(s) Oral every 8 hours  acetaminophen   IVPB .. 1000 milliGRAM(s) IV Intermittent once  apixaban 2.5 milliGRAM(s) Oral every 12 hours  buPROPion XL (24-Hour) . 150 milliGRAM(s) Oral daily  donepezil 5 milliGRAM(s) Oral <User Schedule>  flecainide 50 milliGRAM(s) Oral two times a day  fludroCORTISONE 0.1 milliGRAM(s) Oral daily  memantine 10 milliGRAM(s) Oral two times a day  pantoprazole    Tablet 40 milliGRAM(s) Oral before breakfast  polyethylene glycol 3350 17 Gram(s) Oral daily  senna 2 Tablet(s) Oral at bedtime  sodium chloride 0.9%. 1000 milliLiter(s) (100 mL/Hr) IV Continuous <Continuous>    MEDICATIONS  (PRN):  gabapentin 100 milliGRAM(s) Oral at bedtime PRN sleep aide          VITALS:   T(C): 37.7 (07-29-24 @ 12:15), Max: 37.7 (07-29-24 @ 12:15)  HR: 80 (07-29-24 @ 12:15) (80 - 92)  BP: 120/88 (07-28-24 @ 21:01) (120/88 - 120/88)  RR: 18 (07-29-24 @ 12:15) (18 - 18)  SpO2: 95% (07-29-24 @ 12:50) (93% - 95%)  Wt(kg): --    PHYSICAL EXAM:  GENERAL: NAD, well-groomed, well-developed  HEAD:  Atraumatic, Normocephalic  EYES: EOMI, PERRLA, conjunctiva and sclera clear  ENMT: No tonsillar erythema, exudates, or enlargement; Moist mucous membranes, Good dentition, No lesions  NECK: Supple, No JVD, Normal thyroid  NERVOUS SYSTEM:  Alert & Oriented X2-3,Motor Strength 5/5 B/L upper and lower extremities; DTRs 2+ intact and symmetric  CHEST/LUNG: Clear to percussion bilaterally; No rales, rhonchi, wheezing, or rubs  HEART: Regular rate and rhythm; No murmurs, rubs, or gallops  ABDOMEN: Soft, Nontender, Nondistended; Bowel sounds present  EXTREMITIES:  2+ Peripheral Pulses, No clubbing, cyanosis, or edema  LYMPH: No lymphadenopathy noted  SKIN: No rashes or lesions    LABS:        CBC Full  -  ( 29 Jul 2024 11:38 )  WBC Count : 7.57 K/uL  RBC Count : 4.06 M/uL  Hemoglobin : 13.1 g/dL  Hematocrit : 40.2 %  Platelet Count - Automated : 130 K/uL  Mean Cell Volume : 99.0 fl  Mean Cell Hemoglobin : 32.3 pg  Mean Cell Hemoglobin Concentration : 32.6 gm/dL  Auto Neutrophil # : x  Auto Lymphocyte # : x  Auto Monocyte # : x  Auto Eosinophil # : x  Auto Basophil # : x  Auto Neutrophil % : x  Auto Lymphocyte % : x  Auto Monocyte % : x  Auto Eosinophil % : x  Auto Basophil % : x    07-29    140  |  103  |  27<H>  ----------------------------<  90  4.0   |  22  |  1.26    Ca    9.0      29 Jul 2024 11:38          Urinalysis Basic - ( 29 Jul 2024 11:38 )    Color: x / Appearance: x / SG: x / pH: x  Gluc: 90 mg/dL / Ketone: x  / Bili: x / Urobili: x   Blood: x / Protein: x / Nitrite: x   Leuk Esterase: x / RBC: x / WBC x   Sq Epi: x / Non Sq Epi: x / Bacteria: x      CAPILLARY BLOOD GLUCOSE          RADIOLOGY & ADDITIONAL TESTS:

## 2024-07-29 NOTE — CONSULT NOTE ADULT - ASSESSMENT
Impression:    Sacral/bilateral Buttocks deep tissue injury present on admission  Incontinence of bowel and bladder  Incontinence Dermatitis    Recommend:  1.) topical therapy: sacral/buttock wound – cleanse with incontinence cleanser, pat dry, apply Chavez ointment BID and PRN for incontinent episodes  2.) Incontinence Management - incontinence cleanser, pads, pericare BID  3.) Maintain on an alternating air with low air loss surface  4.) Turn and reposition Q 2 hours  5.) Nutrition optimization - please add Mandeep  6.) Offload heels/feet with complete cair air fluidized boots/pillows; ensure that the soles of the feet are not resting on the foot board of the bed.  7.) chair cushion for chair sitting    Care as per medicine. Will not actively follow but will remain available. Please recall for new issues or deterioration.  Upon discharge f[facility] [address] [place] [address] [phone]  Thank you for this consult  Annie Lacy, DOMENICA-C, CWOCN via TEAMS   Impression:    Sacral/bilateral Buttocks deep tissue injury present on admission  Bilateral thigh mosquito bites  Incontinence of bowel and bladder  Incontinence Dermatitis    Recommend:  1.) topical therapy: sacral/buttock wound – cleanse with incontinence cleanser, pat dry, apply Chavez ointment BID and PRN for incontinent episodes  2.) Incontinence Management - incontinence cleanser, pads, pericare BID  3.) Maintain on an alternating air with low air loss surface  4.) Turn and reposition Q 2 hours  5.) Nutrition optimization - please add Mandeep  6.) Offload heels/feet with complete cair air fluidized boots/pillows; ensure that the soles of the feet are not resting on the foot board of the bed.  7.) chair cushion for chair sitting    Care as per medicine. Will not actively follow but will remain available. Please recall for new issues or deterioration.  Upon discharge f/u as outpatient at [facility] [address] [place] [address] [phone]  Thank you for this consult  Annie Lacy, DOMENICA-C, CWOCN via TEAMS

## 2024-07-29 NOTE — PROGRESS NOTE ADULT - ASSESSMENT
77 yo male present after a fall with a right hip fracture. Patient is s/p  an Open reduction and internal fixation of the right hip

## 2024-07-29 NOTE — CONSULT NOTE ADULT - SUBJECTIVE AND OBJECTIVE BOX
Wound Surgery Consult Note:    HPI:  HPI  78yMale w/ HTN, HLD, AFib (no AC), mild dementia, urinary incontinence c/o R hip pain s/p mechanical fall into pool earlier today. Unable to bear weight in the RLE since the fall. Denies headstrike or LOC. Denies numbness/tingling in the RLE. Denies any other trauma/injuries at this time. At baseline, community ambulator w/o assistive devices. Patient and his partner are visiting from [place].   (26 Jul 2024 20:53)    Request for wound care evaluation of the sacrum and bilateral buttocks received from nursing. Mr. Nunez was encountered on an alternating air with low air loss surface. He is incontinent of stool and urine. His immobility, inactivity, incontinence of bowel and bladder in addition to poor nutritional status all contribute to his risk of pressure injury development and hinder healing. Principles of pressure injury prevention including but not limited to turning and positioning reviewed with patient.     PAST MEDICAL & SURGICAL HISTORY:  HTN  HLD  Afib  Dementia    REVIEW OF SYSTEMS:  CONSTITUTIONAL: + weakness, no fevers or chills  EYES/ENT: No visual changes;  No vertigo or throat pain   MOUTH: No oral lesion, moist  NECK: No pain or stiffness  RESPIRATORY: No cough, wheezing, hemoptysis; No shortness of breath  CARDIOVASCULAR: No chest pain or palpitations  GASTROINTESTINAL: No abdominal or epigastric pain. No nausea, vomiting, or hematemesis; No diarrhea or constipation. No melena or hematochezia.  GENITOURINARY: No dysuria, frequency or hematuria  NEUROLOGICAL: no numbness  SKIN: No itching, rashes  PSYCH: no confusion or altered mental status      MEDICATIONS  (STANDING):  acetaminophen     Tablet .. 1000 milliGRAM(s) Oral every 8 hours  acetaminophen   IVPB .. 1000 milliGRAM(s) IV Intermittent once  apixaban 2.5 milliGRAM(s) Oral every 12 hours  buPROPion XL (24-Hour) . 150 milliGRAM(s) Oral daily  donepezil 5 milliGRAM(s) Oral <User Schedule>  flecainide 50 milliGRAM(s) Oral two times a day  fludroCORTISONE 0.1 milliGRAM(s) Oral daily  memantine 10 milliGRAM(s) Oral two times a day  pantoprazole    Tablet 40 milliGRAM(s) Oral before breakfast  polyethylene glycol 3350 17 Gram(s) Oral daily  senna 2 Tablet(s) Oral at bedtime  sodium chloride 0.9%. 1000 milliLiter(s) (100 mL/Hr) IV Continuous <Continuous>    MEDICATIONS  (PRN):  gabapentin 100 milliGRAM(s) Oral at bedtime PRN sleep aide    Allergies    No Known Allergies    Intolerances    SOCIAL HISTORY:  ; Denies smoking, ETOH, drugs    FAMILY HISTORY:    Vital Signs Last 24 Hrs  T(C): 37 (29 Jul 2024 17:48), Max: 37.7 (29 Jul 2024 12:15)  T(F): 98.6 (29 Jul 2024 17:48), Max: 99.8 (29 Jul 2024 12:15)  HR: 65 (29 Jul 2024 17:48) (65 - 92)  BP: 100/67 (29 Jul 2024 17:48) (100/67 - 120/88)  BP(mean): --  RR: 18 (29 Jul 2024 17:48) (18 - 18)  SpO2: 93% (29 Jul 2024 17:48) (93% - 95%)    Parameters below as of 29 Jul 2024 17:48  Patient On (Oxygen Delivery Method): room air    Physical Exam:  General: alert, WN  Ophthamology: sclera clear  ENMT: moist mucous membranes, trachea midline  Respiratory: equal chest rise with respirations  Gastrointestinal: soft NT/ND  Neurology: verbal,  following commands  Psych: calm, cooperative  Musculoskeletal: no contractures  Vascular: BLE edema equal  Skin:  Sacral/bilateral buttocks with dusky maroon, macerated skin in and around the gluteal cleft L 5cm x 3cm x 0.1cm, scant serosanguinous drainage  No odor, erythema, increased warmth, tenderness, induration, fluctuance    LABS:  07-29    140  |  103  |  27<H>  ----------------------------<  90  4.0   |  22  |  1.26    Ca    9.0      29 Jul 2024 11:38                            13.1   7.57  )-----------( 130      ( 29 Jul 2024 11:38 )             40.2       Urinalysis Basic - ( 29 Jul 2024 11:38 )    Color: x / Appearance: x / SG: x / pH: x  Gluc: 90 mg/dL / Ketone: x  / Bili: x / Urobili: x   Blood: x / Protein: x / Nitrite: x   Leuk Esterase: x / RBC: x / WBC x   Sq Epi: x / Non Sq Epi: x / Bacteria: x       Wound Surgery Consult Note:    HPI:  HPI  78yMale w/ HTN, HLD, AFib (no AC), mild dementia, urinary incontinence c/o R hip pain s/p mechanical fall into pool earlier today. Unable to bear weight in the RLE since the fall. Denies headstrike or LOC. Denies numbness/tingling in the RLE. Denies any other trauma/injuries at this time. At baseline, community ambulator w/o assistive devices. Patient and his partner are visiting from [place].   (26 Jul 2024 20:53)    Request for wound care evaluation of the sacrum and bilateral buttocks received from nursing. Mr. Nunez was encountered on an alternating air with low air loss surface. He is incontinent of stool and urine. His immobility, inactivity, incontinence of bowel and bladder in addition to poor nutritional status all contribute to his risk of pressure injury development and hinder healing. Principles of pressure injury prevention including but not limited to turning and positioning reviewed with patient.     PAST MEDICAL & SURGICAL HISTORY:  HTN  HLD  Afib  Dementia    REVIEW OF SYSTEMS:  CONSTITUTIONAL: + weakness, no fevers or chills  EYES/ENT: No visual changes;  No vertigo or throat pain   MOUTH: No oral lesion, moist  NECK: No pain or stiffness  RESPIRATORY: No cough, wheezing, hemoptysis; No shortness of breath  CARDIOVASCULAR: No chest pain or palpitations  GASTROINTESTINAL: No abdominal or epigastric pain. No nausea, vomiting, or hematemesis; No diarrhea or constipation. No melena or hematochezia.  GENITOURINARY: No dysuria, frequency or hematuria  NEUROLOGICAL: no numbness  SKIN: No itching, rashes  PSYCH: no confusion or altered mental status      MEDICATIONS  (STANDING):  acetaminophen     Tablet .. 1000 milliGRAM(s) Oral every 8 hours  acetaminophen   IVPB .. 1000 milliGRAM(s) IV Intermittent once  apixaban 2.5 milliGRAM(s) Oral every 12 hours  buPROPion XL (24-Hour) . 150 milliGRAM(s) Oral daily  donepezil 5 milliGRAM(s) Oral <User Schedule>  flecainide 50 milliGRAM(s) Oral two times a day  fludroCORTISONE 0.1 milliGRAM(s) Oral daily  memantine 10 milliGRAM(s) Oral two times a day  pantoprazole    Tablet 40 milliGRAM(s) Oral before breakfast  polyethylene glycol 3350 17 Gram(s) Oral daily  senna 2 Tablet(s) Oral at bedtime  sodium chloride 0.9%. 1000 milliLiter(s) (100 mL/Hr) IV Continuous <Continuous>    MEDICATIONS  (PRN):  gabapentin 100 milliGRAM(s) Oral at bedtime PRN sleep aide    Allergies    No Known Allergies    Intolerances    SOCIAL HISTORY:  ; Denies smoking, ETOH, drugs    FAMILY HISTORY:    Vital Signs Last 24 Hrs  T(C): 37 (29 Jul 2024 17:48), Max: 37.7 (29 Jul 2024 12:15)  T(F): 98.6 (29 Jul 2024 17:48), Max: 99.8 (29 Jul 2024 12:15)  HR: 65 (29 Jul 2024 17:48) (65 - 92)  BP: 100/67 (29 Jul 2024 17:48) (100/67 - 120/88)  BP(mean): --  RR: 18 (29 Jul 2024 17:48) (18 - 18)  SpO2: 93% (29 Jul 2024 17:48) (93% - 95%)    Parameters below as of 29 Jul 2024 17:48  Patient On (Oxygen Delivery Method): room air    Physical Exam:  General: alert, WN  Ophthamology: sclera clear  ENMT: moist mucous membranes, trachea midline  Respiratory: equal chest rise with respirations  Gastrointestinal: soft NT/ND  Neurology: verbal,  following commands  Psych: calm, cooperative  Musculoskeletal: no contractures  Vascular: BLE edema equal  Skin:  Sacral/bilateral buttocks with dusky maroon, macerated skin in and around the gluteal cleft L 5cm x 3cm x 0.1cm, scant serosanguinous drainage  Bilateral thighs with several scattered pink papules  No odor, erythema, increased warmth, tenderness, induration, fluctuance    LABS:  07-29    140  |  103  |  27<H>  ----------------------------<  90  4.0   |  22  |  1.26    Ca    9.0      29 Jul 2024 11:38                            13.1   7.57  )-----------( 130      ( 29 Jul 2024 11:38 )             40.2       Urinalysis Basic - ( 29 Jul 2024 11:38 )    Color: x / Appearance: x / SG: x / pH: x  Gluc: 90 mg/dL / Ketone: x  / Bili: x / Urobili: x   Blood: x / Protein: x / Nitrite: x   Leuk Esterase: x / RBC: x / WBC x   Sq Epi: x / Non Sq Epi: x / Bacteria: x

## 2024-07-29 NOTE — PROGRESS NOTE ADULT - ASSESSMENT
77 y/o M s/p right hip fracture fixation with IM nail POD#2, PT/OT, D/C planning  Lizbeth Muniz PA-C  Orthopaedic Surgery  Team pager [phone]  [facility] [phone]  [phone]

## 2024-07-30 LAB
ANION GAP SERPL CALC-SCNC: 10 MMOL/L — SIGNIFICANT CHANGE UP (ref 5–17)
BUN SERPL-MCNC: 26 MG/DL — HIGH (ref 7–23)
CALCIUM SERPL-MCNC: 8.4 MG/DL — SIGNIFICANT CHANGE UP (ref 8.4–10.5)
CHLORIDE SERPL-SCNC: 108 MMOL/L — SIGNIFICANT CHANGE UP (ref 96–108)
CO2 SERPL-SCNC: 24 MMOL/L — SIGNIFICANT CHANGE UP (ref 22–31)
CREAT SERPL-MCNC: 1.13 MG/DL — SIGNIFICANT CHANGE UP (ref 0.5–1.3)
EGFR: 67 ML/MIN/1.73M2 — SIGNIFICANT CHANGE UP
GLUCOSE SERPL-MCNC: 89 MG/DL — SIGNIFICANT CHANGE UP (ref 70–99)
HCT VFR BLD CALC: 35.5 % — LOW (ref 39–50)
HGB BLD-MCNC: 11.5 G/DL — LOW (ref 13–17)
MCHC RBC-ENTMCNC: 31.9 PG — SIGNIFICANT CHANGE UP (ref 27–34)
MCHC RBC-ENTMCNC: 32.4 GM/DL — SIGNIFICANT CHANGE UP (ref 32–36)
MCV RBC AUTO: 98.6 FL — SIGNIFICANT CHANGE UP (ref 80–100)
NRBC # BLD: 0 /100 WBCS — SIGNIFICANT CHANGE UP (ref 0–0)
PLATELET # BLD AUTO: 113 K/UL — LOW (ref 150–400)
POTASSIUM SERPL-MCNC: 3.4 MMOL/L — LOW (ref 3.5–5.3)
POTASSIUM SERPL-SCNC: 3.4 MMOL/L — LOW (ref 3.5–5.3)
RBC # BLD: 3.6 M/UL — LOW (ref 4.2–5.8)
RBC # FLD: 12.6 % — SIGNIFICANT CHANGE UP (ref 10.3–14.5)
SODIUM SERPL-SCNC: 142 MMOL/L — SIGNIFICANT CHANGE UP (ref 135–145)
WBC # BLD: 6.1 K/UL — SIGNIFICANT CHANGE UP (ref 3.8–10.5)
WBC # FLD AUTO: 6.1 K/UL — SIGNIFICANT CHANGE UP (ref 3.8–10.5)

## 2024-07-30 PROCEDURE — 99222 1ST HOSP IP/OBS MODERATE 55: CPT

## 2024-07-30 RX ORDER — GABAPENTIN 400 MG/1
100 CAPSULE ORAL
Refills: 0 | Status: DISCONTINUED | OUTPATIENT
Start: 2024-07-30 | End: 2024-07-31

## 2024-07-30 RX ORDER — BACTERIOSTATIC SODIUM CHLORIDE 0.9 %
1000 VIAL (ML) INJECTION
Refills: 0 | Status: DISCONTINUED | OUTPATIENT
Start: 2024-07-30 | End: 2024-08-01

## 2024-07-30 RX ORDER — POTASSIUM CHLORIDE 1500 MG/1
20 TABLET, EXTENDED RELEASE ORAL
Refills: 0 | Status: DISCONTINUED | OUTPATIENT
Start: 2024-07-30 | End: 2024-07-30

## 2024-07-30 RX ORDER — POTASSIUM CHLORIDE 1500 MG/1
20 TABLET, EXTENDED RELEASE ORAL
Refills: 0 | Status: COMPLETED | OUTPATIENT
Start: 2024-07-30 | End: 2024-07-30

## 2024-07-30 RX ADMIN — Medication 400 MILLIGRAM(S): at 05:18

## 2024-07-30 RX ADMIN — SENNOSIDES 2 TABLET(S): 8.6 TABLET ORAL at 18:20

## 2024-07-30 RX ADMIN — Medication 1000 MILLIGRAM(S): at 11:44

## 2024-07-30 RX ADMIN — POTASSIUM CHLORIDE 50 MILLIEQUIVALENT(S): 1500 TABLET, EXTENDED RELEASE ORAL at 19:28

## 2024-07-30 RX ADMIN — Medication 50 MILLILITER(S): at 11:50

## 2024-07-30 RX ADMIN — Medication 5 MILLIGRAM(S): at 11:46

## 2024-07-30 RX ADMIN — Medication 25 MILLIGRAM(S): at 22:58

## 2024-07-30 RX ADMIN — MEMANTINE HYDROCHLORIDE 10 MILLIGRAM(S): 14 CAPSULE, EXTENDED RELEASE ORAL at 11:46

## 2024-07-30 RX ADMIN — Medication 1000 MILLIGRAM(S): at 19:00

## 2024-07-30 RX ADMIN — Medication 1000 MILLIGRAM(S): at 18:17

## 2024-07-30 RX ADMIN — Medication 150 MILLIGRAM(S): at 11:46

## 2024-07-30 RX ADMIN — POTASSIUM CHLORIDE 50 MILLIEQUIVALENT(S): 1500 TABLET, EXTENDED RELEASE ORAL at 12:49

## 2024-07-30 RX ADMIN — PANTOPRAZOLE SODIUM 40 MILLIGRAM(S): 20 TABLET, DELAYED RELEASE ORAL at 11:44

## 2024-07-30 RX ADMIN — POTASSIUM CHLORIDE 50 MILLIEQUIVALENT(S): 1500 TABLET, EXTENDED RELEASE ORAL at 17:22

## 2024-07-30 RX ADMIN — Medication 1000 MILLIGRAM(S): at 05:52

## 2024-07-30 RX ADMIN — APIXABAN 2.5 MILLIGRAM(S): 5 TABLET, FILM COATED ORAL at 18:19

## 2024-07-30 RX ADMIN — FLUDROCORTISONE ACETATE 0.1 MILLIGRAM(S): 0.1 TABLET ORAL at 11:47

## 2024-07-30 RX ADMIN — FLECAINIDE ACETATE 50 MILLIGRAM(S): 100 TABLET ORAL at 18:19

## 2024-07-30 RX ADMIN — Medication 17 GRAM(S): at 11:50

## 2024-07-30 RX ADMIN — FLECAINIDE ACETATE 50 MILLIGRAM(S): 100 TABLET ORAL at 11:47

## 2024-07-30 RX ADMIN — Medication 5 MILLIGRAM(S): at 18:22

## 2024-07-30 RX ADMIN — GABAPENTIN 100 MILLIGRAM(S): 400 CAPSULE ORAL at 18:17

## 2024-07-30 RX ADMIN — APIXABAN 2.5 MILLIGRAM(S): 5 TABLET, FILM COATED ORAL at 11:45

## 2024-07-30 RX ADMIN — Medication 1000 MILLIGRAM(S): at 12:30

## 2024-07-30 RX ADMIN — MEMANTINE HYDROCHLORIDE 10 MILLIGRAM(S): 14 CAPSULE, EXTENDED RELEASE ORAL at 18:18

## 2024-07-30 NOTE — PROGRESS NOTE ADULT - SUBJECTIVE AND OBJECTIVE BOX
78-year-old male past medical history of hypertension, hyperlipidemia presents ED status post mechanical fall on right hip.  Patient was outside on the pool, tripped onto 1 step struck right hip now with persistent right hip pain.  Denies head strike, no LOC, not on any AC, was not ambulatory afterwards.  Did not strike anything else.  Denies fevers chills nausea vomiting diarrhea chest pain shortness of breath abdominal pain dysuria hematuria.  Denies prodromal symptoms. Patient was brought to [facility] for further evaluation and treatment. In the ED he was found to have a right hip fracture and is s/p  an Open reduction and internal fixation of the right. Patient more awake and alert but still confused      MEDICATIONS  (STANDING):  acetaminophen     Tablet .. 1000 milliGRAM(s) Oral every 8 hours  apixaban 2.5 milliGRAM(s) Oral every 12 hours  buPROPion XL (24-Hour) . 150 milliGRAM(s) Oral daily  donepezil 5 milliGRAM(s) Oral <User Schedule>  flecainide 50 milliGRAM(s) Oral two times a day  fludroCORTISONE 0.1 milliGRAM(s) Oral daily  memantine 10 milliGRAM(s) Oral two times a day  pantoprazole    Tablet 40 milliGRAM(s) Oral before breakfast  polyethylene glycol 3350 17 Gram(s) Oral daily  potassium chloride  20 mEq/100 mL IVPB 20 milliEquivalent(s) IV Intermittent every 2 hours  senna 2 Tablet(s) Oral at bedtime  sodium chloride 0.9%. 1000 milliLiter(s) (100 mL/Hr) IV Continuous <Continuous>  sodium chloride 0.9%. 1000 milliLiter(s) (50 mL/Hr) IV Continuous <Continuous>    MEDICATIONS  (PRN):  gabapentin 100 milliGRAM(s) Oral at bedtime PRN sleep aide          VITALS:   T(C): 36.8 (07-30-24 @ 11:27), Max: 37.4 (07-29-24 @ 22:34)  HR: 68 (07-30-24 @ 11:27) (65 - 99)  BP: 138/84 (07-30-24 @ 11:27) (100/67 - 138/84)  RR: 18 (07-30-24 @ 11:27) (18 - 18)  SpO2: 94% (07-30-24 @ 11:27) (93% - 96%)  Wt(kg): --    PHYSICAL EXAM:  GENERAL: NAD, well-groomed, well-developed  HEAD:  Atraumatic, Normocephalic  EYES: EOMI, PERRLA, conjunctiva and sclera clear  ENMT: No tonsillar erythema, exudates, or enlargement; Moist mucous membranes, Good dentition, No lesions  NECK: Supple, No JVD, Normal thyroid  NERVOUS SYSTEM:  Alert & Oriented X2-3,Motor Strength 5/5 B/L upper and lower extremities; DTRs 2+ intact and symmetric  CHEST/LUNG: Clear to percussion bilaterally; No rales, rhonchi, wheezing, or rubs  HEART: Regular rate and rhythm; No murmurs, rubs, or gallops  ABDOMEN: Soft, Nontender, Nondistended; Bowel sounds present  EXTREMITIES:  2+ Peripheral Pulses, No clubbing, cyanosis, or edema  LYMPH: No lymphadenopathy noted  SKIN: No rashes or lesions    LABS:        CBC Full  -  ( 30 Jul 2024 06:32 )  WBC Count : 6.10 K/uL  RBC Count : 3.60 M/uL  Hemoglobin : 11.5 g/dL  Hematocrit : 35.5 %  Platelet Count - Automated : 113 K/uL  Mean Cell Volume : 98.6 fl  Mean Cell Hemoglobin : 31.9 pg  Mean Cell Hemoglobin Concentration : 32.4 gm/dL  Auto Neutrophil # : x  Auto Lymphocyte # : x  Auto Monocyte # : x  Auto Eosinophil # : x  Auto Basophil # : x  Auto Neutrophil % : x  Auto Lymphocyte % : x  Auto Monocyte % : x  Auto Eosinophil % : x  Auto Basophil % : x    07-30    142  |  108  |  26<H>  ----------------------------<  89  3.4<L>   |  24  |  1.13    Ca    8.4      30 Jul 2024 06:32          Urinalysis Basic - ( 30 Jul 2024 06:32 )    Color: x / Appearance: x / SG: x / pH: x  Gluc: 89 mg/dL / Ketone: x  / Bili: x / Urobili: x   Blood: x / Protein: x / Nitrite: x   Leuk Esterase: x / RBC: x / WBC x   Sq Epi: x / Non Sq Epi: x / Bacteria: x      CAPILLARY BLOOD GLUCOSE          RADIOLOGY & ADDITIONAL TESTS:

## 2024-07-30 NOTE — CHART NOTE - NSCHARTNOTEFT_GEN_A_CORE
Patients health care proxy (Wife) would like for patient to continue his care in their home area of [place], [place].  It was highly recommended and contraindicated for patient to use commercial flight to return back to his home living area 2/2 increase risk of developing blood clots/pulmonary embolisms. Patient is POD# 3 s/p right intertrochanteric femur Fx Intramedullary Nail ORIF with Dr. Villalobos and has a high risk for developing DVTs/PE.  Patient wife said she can arrange for her insurance to cover transportation via medical plane ride back to [place].  Patient wife understands the risk of DVT/PE development from taking a commercial flight is is opting to use her insurance to cover for medical airplane ride back to her hometown of [place], [place] where she prefers to have patient start his sub acute rehab care.  It is highly recommended if patient taking flight back to [place] that he will be position supine and will have Intermittent Pneumatic Compressions stocking on during his flight to decrease his risk for DVTs and PEs.         Adelfo Kaplan PA-C  Orthopedic Surgery Team  Team Pager #[phone] Patients health care proxy (Wife) would like for patient to continue his care in their home area of [place], [place].  It was highly recommended and contraindicated for patient to use commercial flight to return back to his home living area 2/2 increase risk of developing blood clots/pulmonary embolisms. Patient is POD# 3 s/p right intertrochanteric femur Fx Intramedullary Nail ORIF with Dr. Villalobos and has a high risk for developing DVTs/PE.  Patient wife said she can arrange for her insurance to cover transportation via medical plane ride back to [place].  Patient wife understands the risk of DVT/PE development from taking a commercial flight is opting to use her insurance to cover for medical airplane ride back to her hometown of [place], [place] where she prefers to have patient start his acute rehab care.  It is highly recommended if patient taking flight back to [place] that he will be position supine and will have Intermittent Pneumatic Compressions stocking on during his flight to decrease his risk for DVTs and PEs.         Adelfo Kaplan PA-C  Orthopedic Surgery Team  Team Pager #[phone]

## 2024-07-30 NOTE — PROGRESS NOTE ADULT - SUBJECTIVE AND OBJECTIVE BOX
ORTHO PROGRESS NOTE    Patient is status post right hip IMN, POD # 3  Patient seen and examined at bedside.   Patient denies CP, SOB, dizziness, HA, N/V/D.  Patient reports pain controlled.    Vital Signs Last 24 Hrs  T(C): 36.8 (30 Jul 2024 05:06), Max: 37.7 (29 Jul 2024 12:15)  T(F): 98.3 (30 Jul 2024 05:06), Max: 99.8 (29 Jul 2024 12:15)  HR: 65 (30 Jul 2024 05:06) (65 - 99)  BP: 128/80 (30 Jul 2024 05:06) (100/67 - 128/80)  BP(mean): --  RR: 18 (30 Jul 2024 05:06) (18 - 18)  SpO2: 93% (30 Jul 2024 05:06) (93% - 96%)    Parameters below as of 30 Jul 2024 05:06  Patient On (Oxygen Delivery Method): room air    Exam:  Gen: No apparent distress  RLE: Dressing clean, dry, and intact  BLE: Motor intact EHL/FHL/TA/GS  SILT in the distal extremities  Calves soft and nontender  + DP pulses

## 2024-07-30 NOTE — PROGRESS NOTE ADULT - ASSESSMENT
A/P: 79 y/o male s/p right hip IMN, POD # 3    - Pain control/analgesia  - DVT ppx: Eliquis, SCDs  - PT/OT - WBAT/OOB  - Incentive spirometer  - GI ppx: Protonix  - Bowel regimen  - Follow up AM labs  - Appreciate medicine, neurology comanagement  - Notify ortho for questions  - Dispo: PT recommended JIMMY Alvarado PA-C  Orthopedic Surgery Team  Team Pager #[phone] A/P: 79 y/o male s/p right hip IMN, POD # 3    - Pain control/analgesia  - DVT ppx: Eliquis, SCDs  - PT/OT - WBAT/OOB  - Incentive spirometer  - GI ppx: Protonix  - Bowel regimen  - Follow up AM labs  - Notify ortho for questions  - Dispo: PT recommended JIMMY Alvarado PA-C  Orthopedic Surgery Team  Team Pager #[phone]

## 2024-07-30 NOTE — CONSULT NOTE ADULT - SUBJECTIVE AND OBJECTIVE BOX
Patient is a 78y old  Male who presents with a chief complaint of R IT fx (30 Jul 2024 07:39)    HPI  78yMale w/ HTN, HLD, AFib (no AC), mild dementia, urinary incontinence c/o R hip pain s/p mechanical fall into pool earlier today. Unable to bear weight in the RLE since the fall. Denies headstrike or LOC. Denies numbness/tingling in the RLE. Denies any other trauma/injuries at this time. At baseline, community ambulator w/o assistive devices. Patient and his partner are visiting from [place].    (26 Jul 2024 20:53)    Interval History:  Patient s/p Placement of trochanteric nail into right hip on 6/27.  Post-op w some orthostasis.     REVIEW OF SYSTEMS: R hip pain- controlled w meds,  No chest pain, shortness of breath, nausea, vomiting or diarhea; other ROS neg     PAST MEDICAL & SURGICAL HISTORY  As above    FUNCTIONAL HISTORY:   Lives in [place] w wife.   PTA Independent    CURRENT FUNCTIONAL STATUS:  Mod A transfers and gait    FAMILY HISTORY   N/C    MEDICATIONS   acetaminophen     Tablet .. 1000 milliGRAM(s) Oral every 8 hours  apixaban 2.5 milliGRAM(s) Oral every 12 hours  buPROPion XL (24-Hour) . 150 milliGRAM(s) Oral daily  donepezil 5 milliGRAM(s) Oral <User Schedule>  flecainide 50 milliGRAM(s) Oral two times a day  fludroCORTISONE 0.1 milliGRAM(s) Oral daily  gabapentin 100 milliGRAM(s) Oral at bedtime PRN  memantine 10 milliGRAM(s) Oral two times a day  pantoprazole    Tablet 40 milliGRAM(s) Oral before breakfast  polyethylene glycol 3350 17 Gram(s) Oral daily  potassium chloride  20 mEq/100 mL IVPB 20 milliEquivalent(s) IV Intermittent every 2 hours  senna 2 Tablet(s) Oral at bedtime  sodium chloride 0.9%. 1000 milliLiter(s) IV Continuous <Continuous>  sodium chloride 0.9%. 1000 milliLiter(s) IV Continuous <Continuous>    ALLERGIES  No Known Allergies    VITALS  T(C): 36.8 (07-30-24 @ 05:06), Max: 37.7 (07-29-24 @ 12:15)  HR: 65 (07-30-24 @ 05:06) (65 - 99)  BP: 128/80 (07-30-24 @ 05:06) (100/67 - 128/80)  RR: 18 (07-30-24 @ 05:06) (18 - 18)  SpO2: 93% (07-30-24 @ 05:06) (93% - 96%)  Wt(kg): --    PHYSICAL EXAM  Constitutional - NAD, Comfortable  HEENT - NCAT, EOMI  Neck - Supple  Chest - No distress, no use of accessory muscles for respiration  Cardiovascular -Well perfused  Abdomen - BS+, Soft, NTND  Extremities - RLE inc intact, dressing intact, No C/C/E, No calf tenderness   Neurologic Exam -                 AAO x 3  Motor non-focal (RLE prox NT)  Sensation intact   Psychiatric - Mood stable, Affect WNL    RECENT LABS/IMAGING  CBC Full  -  ( 30 Jul 2024 06:32 )  WBC Count : 6.10 K/uL  RBC Count : 3.60 M/uL  Hemoglobin : 11.5 g/dL  Hematocrit : 35.5 %  Platelet Count - Automated : 113 K/uL  Mean Cell Volume : 98.6 fl  Mean Cell Hemoglobin : 31.9 pg  Mean Cell Hemoglobin Concentration : 32.4 gm/dL  Auto Neutrophil # : x  Auto Lymphocyte # : x  Auto Monocyte # : x  Auto Eosinophil # : x  Auto Basophil # : x  Auto Neutrophil % : x  Auto Lymphocyte % : x  Auto Monocyte % : x  Auto Eosinophil % : x  Auto Basophil % : x    07-30    142  |  108  |  26<H>  ----------------------------<  89  3.4<L>   |  24  |  1.13    Ca    8.4      30 Jul 2024 06:32      Urinalysis Basic - ( 30 Jul 2024 06:32 )    Color: x / Appearance: x / SG: x / pH: x  Gluc: 89 mg/dL / Ketone: x  / Bili: x / Urobili: x   Blood: x / Protein: x / Nitrite: x   Leuk Esterase: x / RBC: x / WBC x   Sq Epi: x / Non Sq Epi: x / Bacteria: x    Impression:  77 yo with functional deficits secondary to diagnosis of R hip fracture    Plan:  PT- ROM, Bed Mob, Transfers, Amb w AD and bracing as needed  OT- ADLs, bracing  Prec- Falls, Cardiac, WBAT RLE  DVT Prophylaxis - On Apixaban  Skin- Turn q2 h  Dispo- Acute Rehab- patient requires active and ongoing therapeutic interventions of multiple disciplines and can tolerate and benefit from 3 hours of intensive therapies x 2-4wks depending on progress at rehabilitation facility. Can actively participate and benefit from  an intensive rehabilitation program. Requires supervision by a rehabilitation physician to closely monitor orthostatic hypotension and pain mangement and a coordinated interdisciplinary approach to providing rehabilitation.

## 2024-07-30 NOTE — CONSULT NOTE ADULT - CONSULT REASON
Evaluate Rehabilitation Needs
Altered mental status
sacral/bilateral buttocks skin damage
pre op eval

## 2024-07-31 DIAGNOSIS — R05.9 COUGH, UNSPECIFIED: ICD-10-CM

## 2024-07-31 LAB
ANION GAP SERPL CALC-SCNC: 12 MMOL/L — SIGNIFICANT CHANGE UP (ref 5–17)
APPEARANCE UR: CLEAR — SIGNIFICANT CHANGE UP
BACTERIA # UR AUTO: NEGATIVE /HPF — SIGNIFICANT CHANGE UP
BILIRUB UR-MCNC: NEGATIVE — SIGNIFICANT CHANGE UP
BUN SERPL-MCNC: 17 MG/DL — SIGNIFICANT CHANGE UP (ref 7–23)
CALCIUM SERPL-MCNC: 9.2 MG/DL — SIGNIFICANT CHANGE UP (ref 8.4–10.5)
CAST: 0 /LPF — SIGNIFICANT CHANGE UP (ref 0–4)
CHLORIDE SERPL-SCNC: 107 MMOL/L — SIGNIFICANT CHANGE UP (ref 96–108)
CO2 SERPL-SCNC: 21 MMOL/L — LOW (ref 22–31)
COLOR SPEC: YELLOW — SIGNIFICANT CHANGE UP
CREAT SERPL-MCNC: 0.96 MG/DL — SIGNIFICANT CHANGE UP (ref 0.5–1.3)
DIFF PNL FLD: ABNORMAL
EGFR: 82 ML/MIN/1.73M2 — SIGNIFICANT CHANGE UP
GLUCOSE SERPL-MCNC: 106 MG/DL — HIGH (ref 70–99)
GLUCOSE UR QL: NEGATIVE MG/DL — SIGNIFICANT CHANGE UP
HCT VFR BLD CALC: 34.3 % — LOW (ref 39–50)
HGB BLD-MCNC: 11.8 G/DL — LOW (ref 13–17)
KETONES UR-MCNC: 40 MG/DL
LEUKOCYTE ESTERASE UR-ACNC: NEGATIVE — SIGNIFICANT CHANGE UP
MCHC RBC-ENTMCNC: 33 PG — SIGNIFICANT CHANGE UP (ref 27–34)
MCHC RBC-ENTMCNC: 34.4 GM/DL — SIGNIFICANT CHANGE UP (ref 32–36)
MCV RBC AUTO: 95.8 FL — SIGNIFICANT CHANGE UP (ref 80–100)
NITRITE UR-MCNC: NEGATIVE — SIGNIFICANT CHANGE UP
NRBC # BLD: 0 /100 WBCS — SIGNIFICANT CHANGE UP (ref 0–0)
PH UR: 6 — SIGNIFICANT CHANGE UP (ref 5–8)
PLATELET # BLD AUTO: 161 K/UL — SIGNIFICANT CHANGE UP (ref 150–400)
POTASSIUM SERPL-MCNC: 4 MMOL/L — SIGNIFICANT CHANGE UP (ref 3.5–5.3)
POTASSIUM SERPL-SCNC: 4 MMOL/L — SIGNIFICANT CHANGE UP (ref 3.5–5.3)
PROT UR-MCNC: NEGATIVE MG/DL — SIGNIFICANT CHANGE UP
RBC # BLD: 3.58 M/UL — LOW (ref 4.2–5.8)
RBC # FLD: 12.4 % — SIGNIFICANT CHANGE UP (ref 10.3–14.5)
RBC CASTS # UR COMP ASSIST: 7 /HPF — HIGH (ref 0–4)
SODIUM SERPL-SCNC: 140 MMOL/L — SIGNIFICANT CHANGE UP (ref 135–145)
SP GR SPEC: 1.02 — SIGNIFICANT CHANGE UP (ref 1–1.03)
SQUAMOUS # UR AUTO: 0 /HPF — SIGNIFICANT CHANGE UP (ref 0–5)
UROBILINOGEN FLD QL: 1 MG/DL — SIGNIFICANT CHANGE UP (ref 0.2–1)
WBC # BLD: 5.5 K/UL — SIGNIFICANT CHANGE UP (ref 3.8–10.5)
WBC # FLD AUTO: 5.5 K/UL — SIGNIFICANT CHANGE UP (ref 3.8–10.5)
WBC UR QL: 0 /HPF — SIGNIFICANT CHANGE UP (ref 0–5)

## 2024-07-31 PROCEDURE — 99232 SBSQ HOSP IP/OBS MODERATE 35: CPT

## 2024-07-31 PROCEDURE — 71045 X-RAY EXAM CHEST 1 VIEW: CPT | Mod: 26

## 2024-07-31 RX ORDER — GABAPENTIN 400 MG/1
300 CAPSULE ORAL AT BEDTIME
Refills: 0 | Status: DISCONTINUED | OUTPATIENT
Start: 2024-07-31 | End: 2024-08-02

## 2024-07-31 RX ORDER — ACETAMINOPHEN 500 MG
1000 TABLET ORAL ONCE
Refills: 0 | Status: COMPLETED | OUTPATIENT
Start: 2024-07-31 | End: 2024-07-31

## 2024-07-31 RX ORDER — GABAPENTIN 400 MG/1
100 CAPSULE ORAL EVERY 6 HOURS
Refills: 0 | Status: DISCONTINUED | OUTPATIENT
Start: 2024-07-31 | End: 2024-08-02

## 2024-07-31 RX ADMIN — Medication 400 MILLIGRAM(S): at 17:59

## 2024-07-31 RX ADMIN — Medication 1000 MILLIGRAM(S): at 18:29

## 2024-07-31 RX ADMIN — Medication 400 MILLIGRAM(S): at 09:38

## 2024-07-31 RX ADMIN — Medication 1000 MILLIGRAM(S): at 10:08

## 2024-07-31 RX ADMIN — MEMANTINE HYDROCHLORIDE 10 MILLIGRAM(S): 14 CAPSULE, EXTENDED RELEASE ORAL at 12:01

## 2024-07-31 NOTE — PROGRESS NOTE ADULT - ASSESSMENT
A/P: 77 y/o male s/p right hip IMN, POD # 4    - Pain control/analgesia  - DVT ppx: Eliquis, SCDs  - PT/OT - WBAT/OOB  - Incentive spirometer  - GI ppx: Protonix  - Bowel regimen  - Follow up AM labs  - Appreciate medicine, neurology comanagement  - Notify ortho for questions  - Dispo: PT recommended JIMMY Alvarado PA-C  Orthopedic Surgery Team  Team Pager #[phone] A/P: 79 y/o male s/p right hip IMN, POD # 4    - Pain control/analgesia  - DVT ppx: Eliquis, SCDs  - PT/OT - WBAT/OOB  - Incentive spirometer  - GI ppx: Protonix  - Bowel regimen  - Follow up AM labs  - Notify ortho for questions  - Dispo: PT recommended JIMMY Alvarado PA-C  Orthopedic Surgery Team  Team Pager #[phone]

## 2024-07-31 NOTE — PROGRESS NOTE ADULT - SUBJECTIVE AND OBJECTIVE BOX
78-year-old male past medical history of hypertension, hyperlipidemia presents ED status post mechanical fall on right hip.  Patient was outside on the pool, tripped onto 1 step struck right hip now with persistent right hip pain.  Denies head strike, no LOC, not on any AC, was not ambulatory afterwards.  Did not strike anything else.  Denies fevers chills nausea vomiting diarrhea chest pain shortness of breath abdominal pain dysuria hematuria.  Denies prodromal symptoms. Patient was brought to [facility] for further evaluation and treatment. In the ED he was found to have a right hip fracture and is s/p  an Open reduction and internal fixation of the right. Patient with increased agitation last night. given Seroquel and is more lethargic today       MEDICATIONS  (STANDING):  acetaminophen     Tablet .. 1000 milliGRAM(s) Oral every 8 hours  apixaban 2.5 milliGRAM(s) Oral every 12 hours  buPROPion XL (24-Hour) . 150 milliGRAM(s) Oral daily  donepezil 5 milliGRAM(s) Oral <User Schedule>  flecainide 50 milliGRAM(s) Oral two times a day  fludroCORTISONE 0.1 milliGRAM(s) Oral daily  gabapentin 300 milliGRAM(s) Oral at bedtime  memantine 10 milliGRAM(s) Oral two times a day  pantoprazole    Tablet 40 milliGRAM(s) Oral before breakfast  polyethylene glycol 3350 17 Gram(s) Oral daily  senna 2 Tablet(s) Oral at bedtime  sodium chloride 0.9%. 1000 milliLiter(s) (50 mL/Hr) IV Continuous <Continuous>    MEDICATIONS  (PRN):  gabapentin 100 milliGRAM(s) Oral every 6 hours PRN agitation  QUEtiapine 25 milliGRAM(s) Oral daily PRN Extreme Agitation          VITALS:   T(C): 37.2 (07-31-24 @ 09:17), Max: 37.2 (07-31-24 @ 09:17)  HR: 69 (07-31-24 @ 09:17) (69 - 80)  BP: 122/78 (07-31-24 @ 09:17) (121/84 - 147/92)  RR: 16 (07-31-24 @ 09:17) (16 - 18)  SpO2: 92% (07-31-24 @ 11:46) (92% - 97%)  Wt(kg): --    PHYSICAL EXAM:  GENERAL: NAD, well-groomed, well-developed  HEAD:  Atraumatic, Normocephalic  EYES: EOMI, PERRLA, conjunctiva and sclera clear  ENMT: No tonsillar erythema, exudates, or enlargement; Moist mucous membranes, Good dentition, No lesions  NECK: Supple, No JVD, Normal thyroid  NERVOUS SYSTEM:  Alert & Oriented X2-3,Motor Strength 5/5 B/L upper and lower extremities; DTRs 2+ intact and symmetric  CHEST/LUNG: Clear to percussion bilaterally; No rales, rhonchi, wheezing, or rubs  HEART: Regular rate and rhythm; No murmurs, rubs, or gallops  ABDOMEN: Soft, Nontender, Nondistended; Bowel sounds present  EXTREMITIES:  2+ Peripheral Pulses, No clubbing, cyanosis, or edema  LYMPH: No lymphadenopathy noted  SKIN: No rashes or lesions    LABS:        CBC Full  -  ( 30 Jul 2024 06:32 )  WBC Count : 6.10 K/uL  RBC Count : 3.60 M/uL  Hemoglobin : 11.5 g/dL  Hematocrit : 35.5 %  Platelet Count - Automated : 113 K/uL  Mean Cell Volume : 98.6 fl  Mean Cell Hemoglobin : 31.9 pg  Mean Cell Hemoglobin Concentration : 32.4 gm/dL  Auto Neutrophil # : x  Auto Lymphocyte # : x  Auto Monocyte # : x  Auto Eosinophil # : x  Auto Basophil # : x  Auto Neutrophil % : x  Auto Lymphocyte % : x  Auto Monocyte % : x  Auto Eosinophil % : x  Auto Basophil % : x    07-30    142  |  108  |  26<H>  ----------------------------<  89  3.4<L>   |  24  |  1.13    Ca    8.4      30 Jul 2024 06:32          Urinalysis Basic - ( 30 Jul 2024 06:32 )    Color: x / Appearance: x / SG: x / pH: x  Gluc: 89 mg/dL / Ketone: x  / Bili: x / Urobili: x   Blood: x / Protein: x / Nitrite: x   Leuk Esterase: x / RBC: x / WBC x   Sq Epi: x / Non Sq Epi: x / Bacteria: x      CAPILLARY BLOOD GLUCOSE          RADIOLOGY & ADDITIONAL TESTS:

## 2024-07-31 NOTE — PROGRESS NOTE ADULT - SUBJECTIVE AND OBJECTIVE BOX
Patient is a 78y old  Male who presents with a chief complaint of R IT fx (30 Jul 2024 07:39)    Patient resting in bed.  Reported to be agitated overnight.  Per wife, this is uncommon and she thinks it is due to meds.  Max A bed mob and transfers    MEDICATIONS  (STANDING):  acetaminophen     Tablet .. 1000 milliGRAM(s) Oral every 8 hours  apixaban 2.5 milliGRAM(s) Oral every 12 hours  buPROPion XL (24-Hour) . 150 milliGRAM(s) Oral daily  donepezil 5 milliGRAM(s) Oral <User Schedule>  flecainide 50 milliGRAM(s) Oral two times a day  fludroCORTISONE 0.1 milliGRAM(s) Oral daily  memantine 10 milliGRAM(s) Oral two times a day  pantoprazole    Tablet 40 milliGRAM(s) Oral before breakfast  polyethylene glycol 3350 17 Gram(s) Oral daily  senna 2 Tablet(s) Oral at bedtime  sodium chloride 0.9%. 1000 milliLiter(s) (50 mL/Hr) IV Continuous <Continuous>    MEDICATIONS  (PRN):  gabapentin 100 milliGRAM(s) Oral two times a day PRN Agitation/Delirium  QUEtiapine 25 milliGRAM(s) Oral daily PRN Extreme Agitation    Vital Signs Last 24 Hrs  T(C): 37.2 (31 Jul 2024 09:17), Max: 37.2 (31 Jul 2024 09:17)  T(F): 99 (31 Jul 2024 09:17), Max: 99 (31 Jul 2024 09:17)  HR: 69 (31 Jul 2024 09:17) (68 - 80)  BP: 122/78 (31 Jul 2024 09:17) (121/84 - 147/92)  BP(mean): --  RR: 16 (31 Jul 2024 09:17) (16 - 18)  SpO2: 92% (31 Jul 2024 09:17) (92% - 97%)    PHYSICAL EXAM  Constitutional - NAD, Comfortable  HEENT - NCAT, EOMI  Neck - Supple  Chest - No distress, no use of accessory muscles for respiration  Cardiovascular -Well perfused  Abdomen - BS+, Soft, NTND  Extremities - RLE inc intact, dressing intact, No C/C/E, No calf tenderness   Neurologic Exam -                 AAO x 3  Motor non-focal (RLE prox NT)  Sensation intact   Psychiatric - Restless but redirected easily                          11.5   6.10  )-----------( 113      ( 30 Jul 2024 06:32 )             35.5     07-30    142  |  108  |  26<H>  ----------------------------<  89  3.4<L>   |  24  |  1.13    Ca    8.4      30 Jul 2024 06:32    Impression:  79 yo with functional deficits secondary to diagnosis of R hip fracture    Plan:  PT- ROM, Bed Mob, Transfers, Amb w AD and bracing as needed  OT- ADLs, bracing  Prec- Falls, Cardiac, WBAT RLE  DVT Prophylaxis - On Apixaban  Skin- Turn q2 h  Dispo- Acute Rehab- patient requires active and ongoing therapeutic interventions of multiple disciplines and can tolerate and benefit from 3 hours of intensive therapies x 2-4wks depending on progress at rehabilitation facility. Can actively participate and benefit from  an intensive rehabilitation program. Requires supervision by a rehabilitation physician to closely monitor orthostatic hypotension and pain mangement and a coordinated interdisciplinary approach to providing rehabilitation.

## 2024-07-31 NOTE — PROGRESS NOTE ADULT - SUBJECTIVE AND OBJECTIVE BOX
ORTHO PROGRESS NOTE    Patient is status post right hip IMN, POD # 4  Patient seen and examined at bedside.   Patient denies CP, SOB, dizziness, HA, N/V/D.    Vital Signs Last 24 Hrs  T(C): 36.9 (31 Jul 2024 05:14), Max: 36.9 (30 Jul 2024 21:53)  T(F): 98.4 (31 Jul 2024 05:14), Max: 98.5 (30 Jul 2024 21:53)  HR: 73 (31 Jul 2024 05:14) (68 - 80)  BP: 126/82 (31 Jul 2024 05:14) (121/84 - 147/92)  BP(mean): --  RR: 18 (31 Jul 2024 05:14) (18 - 18)  SpO2: 94% (31 Jul 2024 05:14) (94% - 97%)    Parameters below as of 31 Jul 2024 05:14  Patient On (Oxygen Delivery Method): room air    Exam:  Gen: No apparent distress  RLE: Dressing clean, dry, and intact  BLE: Motor intact EHL/FHL/TA/GS  SILT in the distal extremities  Calves soft and nontender  + DP pulses

## 2024-08-01 LAB
24R-OH-CALCIDIOL SERPL-MCNC: 41.1 NG/ML — SIGNIFICANT CHANGE UP (ref 30–80)
AMMONIA BLD-MCNC: <10 UMOL/L — LOW (ref 11–55)
FOLATE SERPL-MCNC: 12.7 NG/ML — SIGNIFICANT CHANGE UP
T4 AB SER-ACNC: 9.2 UG/DL — SIGNIFICANT CHANGE UP (ref 4.6–12)
TSH SERPL-MCNC: 1.23 UIU/ML — SIGNIFICANT CHANGE UP (ref 0.27–4.2)
VIT B12 SERPL-MCNC: 233 PG/ML — SIGNIFICANT CHANGE UP (ref 232–1245)

## 2024-08-01 PROCEDURE — 70450 CT HEAD/BRAIN W/O DYE: CPT | Mod: 26

## 2024-08-01 PROCEDURE — 99222 1ST HOSP IP/OBS MODERATE 55: CPT

## 2024-08-01 RX ORDER — LORAZEPAM 1 MG/1
0.25 TABLET ORAL EVERY 6 HOURS
Refills: 0 | Status: DISCONTINUED | OUTPATIENT
Start: 2024-08-01 | End: 2024-08-06

## 2024-08-01 RX ORDER — ENOXAPARIN SODIUM 120 MG/.8ML
40 INJECTION SUBCUTANEOUS ONCE
Refills: 0 | Status: COMPLETED | OUTPATIENT
Start: 2024-08-01 | End: 2024-08-01

## 2024-08-01 RX ORDER — ACETAMINOPHEN 500 MG
1000 TABLET ORAL ONCE
Refills: 0 | Status: COMPLETED | OUTPATIENT
Start: 2024-08-01 | End: 2024-08-01

## 2024-08-01 RX ORDER — ACETAMINOPHEN 500 MG
1000 TABLET ORAL EVERY 8 HOURS
Refills: 0 | Status: DISCONTINUED | OUTPATIENT
Start: 2024-08-02 | End: 2024-08-02

## 2024-08-01 RX ORDER — BACTERIOSTATIC SODIUM CHLORIDE 0.9 %
500 VIAL (ML) INJECTION ONCE
Refills: 0 | Status: COMPLETED | OUTPATIENT
Start: 2024-08-01 | End: 2024-08-01

## 2024-08-01 RX ORDER — DEXTROSE MONOHYDRATE, SODIUM CHLORIDE, SODIUM LACTATE, CALCIUM CHLORIDE, MAGNESIUM CHLORIDE 1.5; 538; 448; 18.4; 5.08 G/100ML; MG/100ML; MG/100ML; MG/100ML; MG/100ML
1000 SOLUTION INTRAPERITONEAL
Refills: 0 | Status: DISCONTINUED | OUTPATIENT
Start: 2024-08-01 | End: 2024-08-02

## 2024-08-01 RX ORDER — BACTERIOSTATIC SODIUM CHLORIDE 0.9 %
1000 VIAL (ML) INJECTION
Refills: 0 | Status: DISCONTINUED | OUTPATIENT
Start: 2024-08-01 | End: 2024-08-01

## 2024-08-01 RX ORDER — ENOXAPARIN SODIUM 120 MG/.8ML
40 INJECTION SUBCUTANEOUS EVERY 24 HOURS
Refills: 0 | Status: DISCONTINUED | OUTPATIENT
Start: 2024-08-02 | End: 2024-08-06

## 2024-08-01 RX ORDER — ACETAMINOPHEN 500 MG
1000 TABLET ORAL ONCE
Refills: 0 | Status: COMPLETED | OUTPATIENT
Start: 2024-08-02 | End: 2024-08-02

## 2024-08-01 RX ADMIN — Medication 1000 MILLIGRAM(S): at 23:51

## 2024-08-01 RX ADMIN — Medication 1000 MILLIGRAM(S): at 09:58

## 2024-08-01 RX ADMIN — Medication 1000 MILLIGRAM(S): at 18:18

## 2024-08-01 RX ADMIN — Medication 400 MILLIGRAM(S): at 18:03

## 2024-08-01 RX ADMIN — ENOXAPARIN SODIUM 40 MILLIGRAM(S): 120 INJECTION SUBCUTANEOUS at 17:26

## 2024-08-01 RX ADMIN — Medication 1000 MILLIGRAM(S): at 10:28

## 2024-08-01 RX ADMIN — Medication 400 MILLIGRAM(S): at 22:51

## 2024-08-01 RX ADMIN — Medication 1000 MILLILITER(S): at 17:03

## 2024-08-01 RX ADMIN — DEXTROSE MONOHYDRATE, SODIUM CHLORIDE, SODIUM LACTATE, CALCIUM CHLORIDE, MAGNESIUM CHLORIDE 75 MILLILITER(S): 1.5; 538; 448; 18.4; 5.08 SOLUTION INTRAPERITONEAL at 18:25

## 2024-08-01 RX ADMIN — LORAZEPAM 0.25 MILLIGRAM(S): 1 TABLET ORAL at 19:13

## 2024-08-01 NOTE — BH CONSULTATION LIAISON ASSESSMENT NOTE - HPI (INCLUDE ILLNESS QUALITY, SEVERITY, DURATION, TIMING, CONTEXT, MODIFYING FACTORS, ASSOCIATED SIGNS AND SYMPTOMS)
Patient is a 78-year-old man, domiciled with wife in [place], with no past psychiatric history, with a past medical history of Parkinson's syndrome/Lewy Body Dementia, HTN, HLD, who presented to the ED status post mechanical fall on right hip. Patient was outside on the pool, tripped onto 1 step struck right hip now with persistent right hip pain.  Denies head strike, no LOC, not on any AC, was not ambulatory afterwards.  Did not strike anything else.  Denies fevers chills nausea vomiting diarrhea chest pain shortness of breath abdominal pain dysuria hematuria.  Denies prodromal symptoms. Patient was brought to [facility] for further evaluation and treatment. In the ED he was found to have a right hip fracture and is s/p an Open reduction and internal fixation of the right hip. Post op, patient has been increasingly lethargic, unresponsive, and agitated over the past 5 days. Picking at the air, uncovering self, internally preoccupied. Was given Seroquel and became more lethargic. Patient is a 78-year-old man, domiciled with wife in [place], with no past psychiatric history, with a past medical history of Parkinson's syndrome/Lewy Body Dementia, HTN, HLD, who presented to the ED status post mechanical fall on right hip, psych consulted for med mgt, deliriumm.    In the ED he was found to have a right hip fracture and is s/p an Open reduction and internal fixation of the right hip. Post op, patient has been increasingly lethargic, unresponsive, and agitated over the past 5 days as per staff. Pt picking at the air, uncovering self, internally preoccupied. Was given Seroquel 25mg pox 1 and became more lethargic on 7/30. Pt poor historian, confused, mumbling speech, illogical. pt disoriented, unable to explain reasons for admission.   collateral obtained from wife, states pt has been confused, disoriented since surgery. pt was more alert, less confused at baseline. pt has not been physically aggressive. she was inquiring about nuplazid, explained was not offered here. she didn't want pt getting seroquel, zyrpexa. she states pt had severe lethargy in response to zxyprexa in past.

## 2024-08-01 NOTE — BH CONSULTATION LIAISON ASSESSMENT NOTE - RISK ASSESSMENT
Patient is low risk for suicidal or homicidal behavior. There are no known risk factors. Protective factors include positive therapeutic relationship, residential, relationship, and employment stability, and engagement in treatment

## 2024-08-01 NOTE — PROGRESS NOTE ADULT - SUBJECTIVE AND OBJECTIVE BOX
ORTHO PROGRESS NOTE    Patient is status post right hip IMN, POD # 5  Patient seen and examined at bedside.   Patient denies CP, SOB, dizziness, HA, N/V/D.    Vital Signs Last 24 Hrs  T(C): 36.8 (01 Aug 2024 05:06), Max: 37.2 (31 Jul 2024 09:17)  T(F): 98.2 (01 Aug 2024 05:06), Max: 99 (31 Jul 2024 09:17)  HR: 77 (01 Aug 2024 05:06) (69 - 79)  BP: 128/82 (01 Aug 2024 05:06) (116/88 - 128/82)  BP(mean): --  RR: 16 (01 Aug 2024 05:06) (16 - 16)  SpO2: 93% (01 Aug 2024 05:06) (92% - 95%)    Parameters below as of 01 Aug 2024 05:06  Patient On (Oxygen Delivery Method): room air    Exam:  Gen: No apparent distress  RLE: Dressing clean, dry, and intact  BLE: Motor intact EHL/FHL/TA/GS  SILT in the distal extremities  Calves soft and nontender  + DP pulses

## 2024-08-01 NOTE — PROVIDER CONTACT NOTE (CHANGE IN STATUS NOTIFICATION) - SITUATION
Pt. has been unable to take his po medications. Spouse refused Miralax and Wellbutrin 150mg po for 12pm.

## 2024-08-01 NOTE — BH CONSULTATION LIAISON ASSESSMENT NOTE - CURRENT MEDICATION
MEDICATIONS  (STANDING):  acetaminophen     Tablet .. 1000 milliGRAM(s) Oral every 8 hours  apixaban 2.5 milliGRAM(s) Oral every 12 hours  buPROPion XL (24-Hour) . 150 milliGRAM(s) Oral daily  donepezil 5 milliGRAM(s) Oral <User Schedule>  flecainide 50 milliGRAM(s) Oral two times a day  fludroCORTISONE 0.1 milliGRAM(s) Oral daily  gabapentin 300 milliGRAM(s) Oral at bedtime  memantine 10 milliGRAM(s) Oral two times a day  pantoprazole    Tablet 40 milliGRAM(s) Oral before breakfast  polyethylene glycol 3350 17 Gram(s) Oral daily  senna 2 Tablet(s) Oral at bedtime  sodium chloride 0.9%. 1000 milliLiter(s) (50 mL/Hr) IV Continuous <Continuous>    MEDICATIONS  (PRN):  gabapentin 100 milliGRAM(s) Oral every 6 hours PRN agitation  QUEtiapine 25 milliGRAM(s) Oral daily PRN Extreme Agitation

## 2024-08-01 NOTE — BH CONSULTATION LIAISON ASSESSMENT NOTE - SUMMARY
Patient is a 78-year-old man, domiciled with wife in [place], with no past psychiatric history, with a past medical history of Parkinson's syndrome/Lewy Body Dementia, HTN, HLD, who presented to the ED status post mechanical fall on right hip. Patient was outside on the pool, tripped onto 1 step struck right hip now with persistent right hip pain.  Denies head strike, no LOC, not on any AC, was not ambulatory afterwards.  Did not strike anything else.  Denies fevers chills nausea vomiting diarrhea chest pain shortness of breath abdominal pain dysuria hematuria.  Denies prodromal symptoms. Patient was brought to [facility] for further evaluation and treatment. In the ED he was found to have a right hip fracture and is s/p an Open reduction and internal fixation of the right hip. Post op, patient has been increasingly lethargic, unresponsive, and agitated over the past 5 days. Picking at the air, uncovering self, internally preoccupied. Was given Seroquel and became more lethargic.

## 2024-08-01 NOTE — BH CONSULTATION LIAISON ASSESSMENT NOTE - NSBHATTESTCOMMENTATTENDFT_PSY_A_CORE
Patient is a 78-year-old man, domiciled with wife in [place], with no past psychiatric history, with a past medical history of Parkinson's syndrome/Lewy Body Dementia, HTN, HLD, who presented to the ED status post mechanical fall on right hip, psych consulted for med mgt, deliriumm.    In the ED he was found to have a right hip fracture and is s/p an Open reduction and internal fixation of the right hip. Post op, patient has been increasingly lethargic, unresponsive, and agitated over the past 5 days as per staff. Pt picking at the air, uncovering self, internally preoccupied. Was given Seroquel 25mg pox 1 and became more lethargic on 7/30. Pt poor historian, confused, mumbling speech, illogical. pt disoriented, unable to explain reasons for admission.   collateral obtained from wife, states pt has been confused, disoriented since surgery. pt was more alert, less confused at baseline. pt has not been physically aggressive. she was inquiring about nuplazid, explained was not offered here. she didn't want pt getting seroquel, zyrpexa. she states pt had severe lethargy in response to zxyprexa in past. rec ativan 0.25-0.5mg po/iv q6hr prn severe agitation, can d/c seroquel . can also consider depakote 125mg po/iv q6hr prn agitation.

## 2024-08-01 NOTE — BH CONSULTATION LIAISON ASSESSMENT NOTE - NSBHCHARTREVIEWVS_PSY_A_CORE FT
Vital Signs Last 24 Hrs  T(C): 36.6 (01 Aug 2024 08:37), Max: 37.1 (31 Jul 2024 20:24)  T(F): 97.8 (01 Aug 2024 08:37), Max: 98.8 (31 Jul 2024 20:24)  HR: 85 (01 Aug 2024 08:37) (77 - 85)  BP: 131/80 (01 Aug 2024 08:37) (116/88 - 131/80)  BP(mean): --  RR: 16 (01 Aug 2024 08:37) (16 - 16)  SpO2: 93% (01 Aug 2024 08:37) (93% - 95%)    Parameters below as of 01 Aug 2024 08:37  Patient On (Oxygen Delivery Method): room air

## 2024-08-01 NOTE — PROGRESS NOTE ADULT - ASSESSMENT
A/P: 79 y/o male s/p right hip IMN, POD # 5    - Pain control/analgesia  - DVT ppx: Eliquis, SCDs  - PT/OT - WBAT/OOB  - Incentive spirometer  - GI ppx: Protonix  - Bowel regimen  - Follow up AM labs  - Notify ortho for questions  - Dispo: PT recommended JIMMY Alvarado PA-C  Orthopedic Surgery Team  Team Pager #[phone]

## 2024-08-01 NOTE — PROGRESS NOTE ADULT - ASSESSMENT
79 yo male present after a fall with a right hip fracture. Patient is s/p  an Open reduction and internal fixation of the right hip

## 2024-08-01 NOTE — BH CONSULTATION LIAISON ASSESSMENT NOTE - NSBHCONSULTMEDAGITATION_PSY_A_CORE FT
Ativan 2.5mg IV PRN for agitation Ativan 0.25-0.5mg po/iv q6hr PRN for agitation, or depakote 125mg po/iv q6hr prn agitation severe

## 2024-08-01 NOTE — PROGRESS NOTE ADULT - SUBJECTIVE AND OBJECTIVE BOX
78-year-old male past medical history of hypertension, hyperlipidemia presents ED status post mechanical fall on right hip.  Patient was outside on the pool, tripped onto 1 step struck right hip now with persistent right hip pain.  Denies head strike, no LOC, not on any AC, was not ambulatory afterwards.  Did not strike anything else.  Denies fevers chills nausea vomiting diarrhea chest pain shortness of breath abdominal pain dysuria hematuria.  Denies prodromal symptoms. Patient was brought to [facility] for further evaluation and treatment. In the ED he was found to have a right hip fracture and is s/p  an Open reduction and internal fixation of the right. Patient with increased agitation last night. Psych following. Patient started on ativan      MEDICATIONS  (STANDING):  acetaminophen   IVPB .. 1000 milliGRAM(s) IV Intermittent once  buPROPion XL (24-Hour) . 150 milliGRAM(s) Oral daily  donepezil 5 milliGRAM(s) Oral <User Schedule>  flecainide 50 milliGRAM(s) Oral two times a day  fludroCORTISONE 0.1 milliGRAM(s) Oral daily  gabapentin 300 milliGRAM(s) Oral at bedtime  memantine 10 milliGRAM(s) Oral two times a day  pantoprazole    Tablet 40 milliGRAM(s) Oral before breakfast  polyethylene glycol 3350 17 Gram(s) Oral daily  senna 2 Tablet(s) Oral at bedtime  sodium chloride 0.9% 1000 milliLiter(s) (75 mL/Hr) IV Continuous <Continuous>    MEDICATIONS  (PRN):  gabapentin 100 milliGRAM(s) Oral every 6 hours PRN agitation  LORazepam     Tablet 0.25 milliGRAM(s) Oral every 6 hours PRN Agitation  LORazepam   Injectable 0.25 milliGRAM(s) IV Push every 6 hours PRN severe agitation          VITALS:   T(C): 37.3 (24 @ 20:54), Max: 37.3 (24 @ 20:54)  HR: 91 (24 @ 20:54) (77 - 91)  BP: 137/91 (24 @ 20:54) (128/82 - 137/91)  RR: 16 (24 @ 20:54) (16 - 16)  SpO2: 94% (24 @ 20:54) (91% - 94%)  Wt(kg): --      PHYSICAL EXAM:  GENERAL: NAD, well-groomed, well-developed  HEAD:  Atraumatic, Normocephalic  EYES: EOMI, PERRLA, conjunctiva and sclera clear  ENMT: No tonsillar erythema, exudates, or enlargement; Moist mucous membranes, Good dentition, No lesions  NECK: Supple, No JVD, Normal thyroid  NERVOUS SYSTEM:  Alert & Oriented X2-3,Motor Strength 5/5 B/L upper and lower extremities; DTRs 2+ intact and symmetric  CHEST/LUNG: Clear to percussion bilaterally; No rales, rhonchi, wheezing, or rubs  HEART: Regular rate and rhythm; No murmurs, rubs, or gallops  ABDOMEN: Soft, Nontender, Nondistended; Bowel sounds present  EXTREMITIES:  2+ Peripheral Pulses, No clubbing, cyanosis, or edema  LYMPH: No lymphadenopathy noted  SKIN: No rashes or lesions  LABS:        CBC Full  -  ( 2024 16:44 )  WBC Count : 5.50 K/uL  RBC Count : 3.58 M/uL  Hemoglobin : 11.8 g/dL  Hematocrit : 34.3 %  Platelet Count - Automated : 161 K/uL  Mean Cell Volume : 95.8 fl  Mean Cell Hemoglobin : 33.0 pg  Mean Cell Hemoglobin Concentration : 34.4 gm/dL  Auto Neutrophil # : x  Auto Lymphocyte # : x  Auto Monocyte # : x  Auto Eosinophil # : x  Auto Basophil # : x  Auto Neutrophil % : x  Auto Lymphocyte % : x  Auto Monocyte % : x  Auto Eosinophil % : x  Auto Basophil % : x    07-31    140  |  107  |  17  ----------------------------<  106<H>  4.0   |  21<L>  |  0.96    Ca    9.2      2024 16:44          Urinalysis Basic - ( 2024 18:07 )    Color: Yellow / Appearance: Clear / S.022 / pH: x  Gluc: x / Ketone: 40 mg/dL  / Bili: Negative / Urobili: 1.0 mg/dL   Blood: x / Protein: Negative mg/dL / Nitrite: Negative   Leuk Esterase: Negative / RBC: 7 /HPF / WBC 0 /HPF   Sq Epi: x / Non Sq Epi: 0 /HPF / Bacteria: Negative /HPF      CAPILLARY BLOOD GLUCOSE          RADIOLOGY & ADDITIONAL TESTS:

## 2024-08-01 NOTE — BH CONSULTATION LIAISON ASSESSMENT NOTE - NSBHCHARTREVIEWLAB_PSY_A_CORE FT
07-31    140  |  107  |  17  ----------------------------<  106<H>  4.0   |  21<L>  |  0.96    Ca    9.2      31 Jul 2024 16:44    CBC Full  -  ( 31 Jul 2024 16:44 )  WBC Count : 5.50 K/uL  RBC Count : 3.58 M/uL  Hemoglobin : 11.8 g/dL  Hematocrit : 34.3 %  Platelet Count - Automated : 161 K/uL  Mean Cell Volume : 95.8 fl  Mean Cell Hemoglobin : 33.0 pg  Mean Cell Hemoglobin Concentration : 34.4 gm/dL  Auto Neutrophil # : x  Auto Lymphocyte # : x  Auto Monocyte # : x  Auto Eosinophil # : x  Auto Basophil # : x  Auto Neutrophil % : x  Auto Lymphocyte % : x  Auto Monocyte % : x  Auto Eosinophil % : x  Auto Basophil % : x

## 2024-08-01 NOTE — BH CONSULTATION LIAISON ASSESSMENT NOTE - DESCRIPTION
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 no alcohol, tobacco, or recreational substance use history

## 2024-08-02 PROCEDURE — 99233 SBSQ HOSP IP/OBS HIGH 50: CPT

## 2024-08-02 PROCEDURE — 99231 SBSQ HOSP IP/OBS SF/LOW 25: CPT

## 2024-08-02 RX ORDER — CYANOCOBALAMIN/FOLIC AC/VIT B6 2-2.5-25MG
1 TABLET ORAL DAILY
Refills: 0 | Status: DISCONTINUED | OUTPATIENT
Start: 2024-08-02 | End: 2024-08-06

## 2024-08-02 RX ORDER — CALCIUM CARBONATE/VITAMIN D2 600-3.125
1 TABLET ORAL
Refills: 0 | Status: DISCONTINUED | OUTPATIENT
Start: 2024-08-02 | End: 2024-08-06

## 2024-08-02 RX ORDER — MELATONIN 3 MG
3 TABLET ORAL AT BEDTIME
Refills: 0 | Status: DISCONTINUED | OUTPATIENT
Start: 2024-08-02 | End: 2024-08-06

## 2024-08-02 RX ORDER — ACETAMINOPHEN 500 MG
1000 TABLET ORAL ONCE
Refills: 0 | Status: COMPLETED | OUTPATIENT
Start: 2024-08-02 | End: 2024-08-02

## 2024-08-02 RX ORDER — BACTERIOSTATIC SODIUM CHLORIDE 0.9 %
1000 VIAL (ML) INJECTION
Refills: 0 | Status: DISCONTINUED | OUTPATIENT
Start: 2024-08-02 | End: 2024-08-05

## 2024-08-02 RX ORDER — ACETAMINOPHEN 500 MG
1000 TABLET ORAL EVERY 8 HOURS
Refills: 0 | Status: DISCONTINUED | OUTPATIENT
Start: 2024-08-03 | End: 2024-08-03

## 2024-08-02 RX ORDER — ACETAMINOPHEN 500 MG
1000 TABLET ORAL ONCE
Refills: 0 | Status: COMPLETED | OUTPATIENT
Start: 2024-08-03 | End: 2024-08-03

## 2024-08-02 RX ORDER — CYANOCOBALAMIN 1000 UG/ML
1000 INJECTION, SOLUTION INTRAMUSCULAR; SUBCUTANEOUS DAILY
Refills: 0 | Status: DISCONTINUED | OUTPATIENT
Start: 2024-08-02 | End: 2024-08-06

## 2024-08-02 RX ADMIN — Medication 5 MILLIGRAM(S): at 18:04

## 2024-08-02 RX ADMIN — Medication 1000 MILLIGRAM(S): at 06:18

## 2024-08-02 RX ADMIN — ENOXAPARIN SODIUM 40 MILLIGRAM(S): 120 INJECTION SUBCUTANEOUS at 10:03

## 2024-08-02 RX ADMIN — Medication 5 MILLIGRAM(S): at 05:44

## 2024-08-02 RX ADMIN — Medication 17 GRAM(S): at 14:18

## 2024-08-02 RX ADMIN — PANTOPRAZOLE SODIUM 40 MILLIGRAM(S): 20 TABLET, DELAYED RELEASE ORAL at 05:43

## 2024-08-02 RX ADMIN — MEMANTINE HYDROCHLORIDE 10 MILLIGRAM(S): 14 CAPSULE, EXTENDED RELEASE ORAL at 05:44

## 2024-08-02 RX ADMIN — Medication 1000 MILLIGRAM(S): at 15:20

## 2024-08-02 RX ADMIN — Medication 1000 MILLIGRAM(S): at 14:18

## 2024-08-02 RX ADMIN — Medication 400 MILLIGRAM(S): at 05:43

## 2024-08-02 RX ADMIN — FLECAINIDE ACETATE 50 MILLIGRAM(S): 100 TABLET ORAL at 05:43

## 2024-08-02 RX ADMIN — Medication 400 MILLIGRAM(S): at 22:05

## 2024-08-02 RX ADMIN — FLECAINIDE ACETATE 50 MILLIGRAM(S): 100 TABLET ORAL at 18:03

## 2024-08-02 RX ADMIN — MEMANTINE HYDROCHLORIDE 10 MILLIGRAM(S): 14 CAPSULE, EXTENDED RELEASE ORAL at 18:04

## 2024-08-02 RX ADMIN — FLUDROCORTISONE ACETATE 0.1 MILLIGRAM(S): 0.1 TABLET ORAL at 05:44

## 2024-08-02 RX ADMIN — Medication 1000 MILLIGRAM(S): at 23:01

## 2024-08-02 NOTE — PROGRESS NOTE ADULT - SUBJECTIVE AND OBJECTIVE BOX
Patient comfortable  No complaints.   As per overnight RN, no events overnight; patient slept a few hours on and off.    T(C): 36.9 (08-02-24 @ 12:38), Max: 37.3 (08-01-24 @ 20:54)  HR: 85 (08-02-24 @ 12:38) (63 - 91)  BP: 116/81 (08-02-24 @ 12:38) (116/81 - 137/91)  RR: 18 (08-02-24 @ 12:38) (16 - 18)  SpO2: 97% (08-02-24 @ 12:38) (94% - 98%)      PHYSICAL EXAM:  Gen: Awake, following commands, eating pudding   Right Hip: Dressings C/D/I; compartments soft, dull sensation grossly intact to light touch; (+) EHL/FHL/DF/PF; (+) Distal Pulses; No Calf tenderness B/L, PAS     LABS:                        11.8   5.50  )-----------( 161      ( 31 Jul 2024 16:44 )             34.3     07-31    140  |  107  |  17  ----------------------------<  106<H>  4.0   |  21<L>  |  0.96    Ca    9.2      31 Jul 2024 16:44

## 2024-08-02 NOTE — PROVIDER CONTACT NOTE (OTHER) - ACTION/TREATMENT ORDERED:
As per provider, okay to push back medication to when pt wife is here.
MD Stephanie Cavazos made aware. Safety precautions monitored. Hourly rounding in progress.
FAHAD Donaldson made aware, to assess pt at bedside.

## 2024-08-02 NOTE — PROVIDER CONTACT NOTE (OTHER) - RECOMMENDATIONS
Notify the provider
Since pt refusing meds, push meds back to cluster afternoon meds. Wife helps pt take meds when she is here. Meds had to be pushed back yesterday as well, pt took meds better with wife around.
change medications to IV route, if available.

## 2024-08-02 NOTE — BH CONSULTATION LIAISON PROGRESS NOTE - NSBHATTESTCOMMENTATTENDFT_PSY_A_CORE
Patient is a 78-year-old man, domiciled with wife in [place], with no past psychiatric history, with a past medical history of Parkinson's syndrome/Lewy Body Dementia, HTN, HLD, who presented to the ED status post mechanical fall on right hip, psych consulted for med mgt, deliriumm.    In the ED he was found to have a right hip fracture and is s/p an Open reduction and internal fixation of the right hip. Post op, patient has been increasingly lethargic, unresponsive, and agitated over the past 5 days as per staff. Pt picking at the air, uncovering self, internally preoccupied. Was given Seroquel 25mg pox 1 and became more lethargic on 7/30. Pt poor historian, confused, mumbling speech, illogical. pt disoriented, unable to explain reasons for admission.   collateral obtained from wife, states pt has been confused, disoriented since surgery. pt was more alert, less confused at baseline. pt has not been physically aggressive. she was inquiring about nuplazid, explained was not offered here. she didn't want pt getting seroquel, zyrpexa. she states pt had severe lethargy in response to zxyprexa in past. rec ativan 0.25-0.5mg po/iv q6hr prn severe agitation, can d/c seroquel . can also consider depakote 125mg po/iv q6hr prn agitation.  Patient is a 78-year-old man, domiciled with wife in [place], with no past psychiatric history, with a past medical history of Parkinson's syndrome/Lewy Body Dementia, HTN, HLD, who presented to the ED status post mechanical fall on right hip, psych consulted for med mgt, deliriumm.    In the ED he was found to have a right hip fracture and is s/p an Open reduction and internal fixation of the right hip. Post op, patient has been increasingly lethargic, unresponsive, and agitated over the past 5 days as per staff. Pt picking at the air, uncovering self, internally preoccupied. Was given Seroquel 25mg pox 1 and became more lethargic on 7/30. Pt poor historian, confused, mumbling speech, illogical. pt disoriented, unable to explain reasons for admission.   collateral obtained from wife, states pt has been confused, disoriented since surgery. pt was more alert, less confused at baseline. pt has not been physically aggressive. she was inquiring about nuplazid, explained was not offered here. she didn't want pt getting seroquel, zyrpexa. she states pt had severe lethargy in response to zxyprexa in past.   pt more alert today, less confused rec ativan 0.25-0.5mg po/iv q6hr prn severe agitation

## 2024-08-02 NOTE — PROGRESS NOTE ADULT - SUBJECTIVE AND OBJECTIVE BOX
78-year-old male past medical history of hypertension, hyperlipidemia presents ED status post mechanical fall on right hip.  Patient was outside on the pool, tripped onto 1 step struck right hip now with persistent right hip pain.  Denies head strike, no LOC, not on any AC, was not ambulatory afterwards.  Did not strike anything else.  Denies fevers chills nausea vomiting diarrhea chest pain shortness of breath abdominal pain dysuria hematuria.  Denies prodromal symptoms. Patient was brought to [facility] for further evaluation and treatment. In the ED he was found to have a right hip fracture and is s/p  an Open reduction and internal fixation of the right. Patient with confusion but has been more awake and alert. Psych following. Patient started on ativan PRN.        MEDICATIONS  (STANDING):  acetaminophen     Tablet .. 1000 milliGRAM(s) Oral every 8 hours  buPROPion XL (24-Hour) . 150 milliGRAM(s) Oral daily  calcium carbonate 1250 mG  + Vitamin D (OsCal 500 + D) 1 Tablet(s) Oral two times a day  donepezil 5 milliGRAM(s) Oral <User Schedule>  enoxaparin Injectable 40 milliGRAM(s) SubCutaneous every 24 hours  flecainide 50 milliGRAM(s) Oral two times a day  fludroCORTISONE 0.1 milliGRAM(s) Oral daily  memantine 10 milliGRAM(s) Oral two times a day  multivitamin 1 Tablet(s) Oral daily  pantoprazole    Tablet 40 milliGRAM(s) Oral before breakfast  polyethylene glycol 3350 17 Gram(s) Oral daily  senna 2 Tablet(s) Oral at bedtime  sodium chloride 0.9%. 1000 milliLiter(s) (75 mL/Hr) IV Continuous <Continuous>    MEDICATIONS  (PRN):  LORazepam     Tablet 0.25 milliGRAM(s) Oral every 6 hours PRN Agitation  LORazepam   Injectable 0.25 milliGRAM(s) IV Push every 6 hours PRN severe agitation          VITALS:   T(C): 36.9 (24 @ 12:38), Max: 37.3 (24 @ 20:54)  HR: 85 (24 @ 12:38) (63 - 91)  BP: 116/81 (24 @ 12:38) (116/81 - 137/91)  RR: 18 (24 @ 12:38) (16 - 18)  SpO2: 97% (24 @ 12:38) (94% - 98%)  Wt(kg): --    PHYSICAL EXAM:  GENERAL: NAD, well-groomed, well-developed  HEAD:  Atraumatic, Normocephalic  EYES: EOMI, PERRLA, conjunctiva and sclera clear  ENMT: No tonsillar erythema, exudates, or enlargement; Moist mucous membranes, Good dentition, No lesions  NECK: Supple, No JVD, Normal thyroid  NERVOUS SYSTEM:  Alert & Oriented X2-3,Motor Strength 5/5 B/L upper and lower extremities; DTRs 2+ intact and symmetric  CHEST/LUNG: Clear to percussion bilaterally; No rales, rhonchi, wheezing, or rubs  HEART: Regular rate and rhythm; No murmurs, rubs, or gallops  ABDOMEN: Soft, Nontender, Nondistended; Bowel sounds present  EXTREMITIES:  2+ Peripheral Pulses, No clubbing, cyanosis, or edema  LYMPH: No lymphadenopathy noted  SKIN: No rashes or lesions    LABS:        CBC Full  -  ( 2024 16:44 )  WBC Count : 5.50 K/uL  RBC Count : 3.58 M/uL  Hemoglobin : 11.8 g/dL  Hematocrit : 34.3 %  Platelet Count - Automated : 161 K/uL  Mean Cell Volume : 95.8 fl  Mean Cell Hemoglobin : 33.0 pg  Mean Cell Hemoglobin Concentration : 34.4 gm/dL  Auto Neutrophil # : x  Auto Lymphocyte # : x  Auto Monocyte # : x  Auto Eosinophil # : x  Auto Basophil # : x  Auto Neutrophil % : x  Auto Lymphocyte % : x  Auto Monocyte % : x  Auto Eosinophil % : x  Auto Basophil % : x    -    140  |  107  |  17  ----------------------------<  106<H>  4.0   |  21<L>  |  0.96    Ca    9.2      2024 16:44          Urinalysis Basic - ( 2024 18:07 )    Color: Yellow / Appearance: Clear / S.022 / pH: x  Gluc: x / Ketone: 40 mg/dL  / Bili: Negative / Urobili: 1.0 mg/dL   Blood: x / Protein: Negative mg/dL / Nitrite: Negative   Leuk Esterase: Negative / RBC: 7 /HPF / WBC 0 /HPF   Sq Epi: x / Non Sq Epi: 0 /HPF / Bacteria: Negative /HPF      CAPILLARY BLOOD GLUCOSE          RADIOLOGY & ADDITIONAL TESTS:

## 2024-08-02 NOTE — PROGRESS NOTE ADULT - ASSESSMENT
Encephalopathy  HTN  Atrial fibrillation on Eliquis  Lewy body dementia versus Parkinson disease  R hip fracture s/p repair  B12 deficiency    - Etiology for above encephalopathy likely acute toxic/metabolic process in the setting of recent surgery, acute medical issues, and poor sleep superimposed on decreased cognitive reserve from baseline dementia. Due to lack of focal neurologic deficits or abnormal movements low suspicion for alternative causes such as cerebrovascular event or seizure. He is improving. 8/2 - Likely hospital delirium or pain leading to agitation and then iatrogenic effect of quetiapine, now improved. CT head, personally reviewed by me, with small vessel ischemic changes and atrophy but no other acute intracranial findings  - CT head w/o as above, no acute events  - Labs as per prior note, would also add on RVP  - B12 low, would replete with B12 2000 mcg PO/SL daily for 3 months VERSUS 1000mcg IM daily for 1 week -> 1000mcg IM weekly for 4 weeks -> 1000mcg IM monthly for 3 months and then recheck new levels  - Continue to address above medical and surgical issues, as you are doing  - Will sign off, please call [phone]) with additional questions or concerns

## 2024-08-02 NOTE — PROVIDER CONTACT NOTE (OTHER) - ASSESSMENT
Pt A&Ox1-2 combative. RN attempted to give medications but pt was getting agitated and restless. RN unable to give PO medications.
A&Ox1, VSS. Hx of mild dementia. Pt experiencing delirium. Pt refusing morning meds.  The meds refused- Eliquis, Aricept, Tambocor, Florinef, Namenda, Protonix ->rescheduled to 12pm.
pt confused, s/p seroquel overnight. delirious, hallucinating. refusing all PO intake

## 2024-08-02 NOTE — PROGRESS NOTE ADULT - SUBJECTIVE AND OBJECTIVE BOX
NEUROLOGY FOLLOW-UP CONSULT NOTE    RFC: Altered mental status (AMS)    Interval history: No acute neurologic events overnight. Patient was agitated night of 7/31-8/1 and given one dose of quetiapine with significant lethargy and worse mental status subsequent day. He is now improved without focal neurologic deficits or abnormal movements.    Meds:  MEDICATIONS  (STANDING):  acetaminophen     Tablet .. 1000 milliGRAM(s) Oral every 8 hours  buPROPion XL (24-Hour) . 150 milliGRAM(s) Oral daily  calcium carbonate 1250 mG  + Vitamin D (OsCal 500 + D) 1 Tablet(s) Oral two times a day  donepezil 5 milliGRAM(s) Oral <User Schedule>  enoxaparin Injectable 40 milliGRAM(s) SubCutaneous every 24 hours  flecainide 50 milliGRAM(s) Oral two times a day  fludroCORTISONE 0.1 milliGRAM(s) Oral daily  gabapentin 300 milliGRAM(s) Oral at bedtime  memantine 10 milliGRAM(s) Oral two times a day  multivitamin 1 Tablet(s) Oral daily  pantoprazole    Tablet 40 milliGRAM(s) Oral before breakfast  polyethylene glycol 3350 17 Gram(s) Oral daily  senna 2 Tablet(s) Oral at bedtime  sodium chloride 0.9%. 1000 milliLiter(s) (75 mL/Hr) IV Continuous <Continuous>    MEDICATIONS  (PRN):  gabapentin 100 milliGRAM(s) Oral every 6 hours PRN agitation  LORazepam     Tablet 0.25 milliGRAM(s) Oral every 6 hours PRN Agitation  LORazepam   Injectable 0.25 milliGRAM(s) IV Push every 6 hours PRN severe agitation      PMHx/PSHx/FHx/SHx:  Fracture of unspecified part of neck of unspecified femur, initial encounter for closed fracture    Handoff    MEWS Score    Hip fracture, right    Hip fracture, right    Delirium    Delirium due to multiple etiologies    Placement of trochanteric nail into right hip    Fractured hip    Hip fracture, right    PAF (paroxysmal atrial fibrillation)    Dementia    Pain    Cough    Placement of trochanteric nail into right hip    FALL DOWN 3 STEPS    [id number]        Allergies:  No Known Allergies      ROS: All systems negative except as documented in Interval history    O:  T(C): 36.9 (08-02-24 @ 12:38), Max: 37.3 (08-01-24 @ 20:54)  HR: 85 (08-02-24 @ 12:38) (63 - 91)  BP: 116/81 (08-02-24 @ 12:38) (116/81 - 137/91)  RR: 18 (08-02-24 @ 12:38) (16 - 18)  SpO2: 97% (08-02-24 @ 12:38) (94% - 98%)    Focused neurologic exam:  MS - Awake and somnolent, AO x2.5 (knew 8/2024 but not rest of date), speech mildly hypophonic but othewise fluent, rep/naming intact, follows commands  CN - PERRL, EOMI, VFF, face sens/str/hearing WNL b/l, tongue/palate midline, trap 5/5 b/l  Motor - Normal bulk/tone, strength 5/5 all except for proximal RLE 2/5 (recent surgery)  Sens - LT/temp intact all  DTR's - Neutral b/l plantar response  Coord - FtN intact b/l  Gait and station - Due to fall risk and recent surgery did not assess    Pertinent labs/studies:  CBC with low H/H 12/34 and MCV WNL, otherwise essentially WNL  PT/INR WNL, PTT WNL  BMP essentially WNL  Albumin 4.2, LFT's WNL  UA (-), TSH/T4 WNL, folate WNL, B12 dec 233, Vitamin D WNL, NH3 WNL    < from: CT Head No Cont (08.01.24 @ 19:24) >  Motion limited study. No definite evidence of acute intracranial   hemorrhage, midline shift or CT evidence of acute territorial infarct.    < end of copied text >

## 2024-08-02 NOTE — PROGRESS NOTE ADULT - ASSESSMENT
A/P: 77 y/o M POD# 6 s/p R IM Nail    DVT ppx- Eliquis 2.5mg PO BID  RLE WBAT  PT  OT  Pain management w Tylenol  Gi ppx  Home meds  Discharge planning to [facility] in [place] as per spouse request        FAHAD Ocampo  Orthopedic Surgery  [phone]

## 2024-08-03 PROCEDURE — 99231 SBSQ HOSP IP/OBS SF/LOW 25: CPT

## 2024-08-03 RX ORDER — ACETAMINOPHEN 500 MG
1000 TABLET ORAL ONCE
Refills: 0 | Status: COMPLETED | OUTPATIENT
Start: 2024-08-03 | End: 2024-08-03

## 2024-08-03 RX ORDER — ACETAMINOPHEN 500 MG
1000 TABLET ORAL EVERY 8 HOURS
Refills: 0 | Status: DISCONTINUED | OUTPATIENT
Start: 2024-08-04 | End: 2024-08-06

## 2024-08-03 RX ORDER — ACETAMINOPHEN 500 MG
1000 TABLET ORAL ONCE
Refills: 0 | Status: COMPLETED | OUTPATIENT
Start: 2024-08-04 | End: 2024-08-04

## 2024-08-03 RX ADMIN — ENOXAPARIN SODIUM 40 MILLIGRAM(S): 120 INJECTION SUBCUTANEOUS at 09:55

## 2024-08-03 RX ADMIN — FLECAINIDE ACETATE 50 MILLIGRAM(S): 100 TABLET ORAL at 18:53

## 2024-08-03 RX ADMIN — Medication 150 MILLIGRAM(S): at 13:21

## 2024-08-03 RX ADMIN — CYANOCOBALAMIN 1000 MICROGRAM(S): 1000 INJECTION, SOLUTION INTRAMUSCULAR; SUBCUTANEOUS at 13:21

## 2024-08-03 RX ADMIN — LORAZEPAM 0.25 MILLIGRAM(S): 1 TABLET ORAL at 09:19

## 2024-08-03 RX ADMIN — Medication 17 GRAM(S): at 13:25

## 2024-08-03 RX ADMIN — Medication 1000 MILLIGRAM(S): at 02:40

## 2024-08-03 RX ADMIN — Medication 400 MILLIGRAM(S): at 21:59

## 2024-08-03 RX ADMIN — MEMANTINE HYDROCHLORIDE 10 MILLIGRAM(S): 14 CAPSULE, EXTENDED RELEASE ORAL at 18:53

## 2024-08-03 RX ADMIN — Medication 5 MILLIGRAM(S): at 13:22

## 2024-08-03 RX ADMIN — Medication 1000 MILLIGRAM(S): at 14:30

## 2024-08-03 RX ADMIN — MEMANTINE HYDROCHLORIDE 10 MILLIGRAM(S): 14 CAPSULE, EXTENDED RELEASE ORAL at 13:22

## 2024-08-03 RX ADMIN — Medication 1000 MILLIGRAM(S): at 17:16

## 2024-08-03 RX ADMIN — Medication 1000 MILLIGRAM(S): at 13:21

## 2024-08-03 RX ADMIN — FLECAINIDE ACETATE 50 MILLIGRAM(S): 100 TABLET ORAL at 13:22

## 2024-08-03 RX ADMIN — Medication 400 MILLIGRAM(S): at 14:20

## 2024-08-03 RX ADMIN — Medication 5 MILLIGRAM(S): at 18:58

## 2024-08-03 RX ADMIN — Medication 400 MILLIGRAM(S): at 06:57

## 2024-08-03 RX ADMIN — Medication 1 TABLET(S): at 18:53

## 2024-08-03 NOTE — BH CONSULTATION LIAISON PROGRESS NOTE - CURRENT MEDICATION
MEDICATIONS  (STANDING):  acetaminophen   IVPB .. 1000 milliGRAM(s) IV Intermittent once  buPROPion XL (24-Hour) . 150 milliGRAM(s) Oral daily  calcium carbonate 1250 mG  + Vitamin D (OsCal 500 + D) 1 Tablet(s) Oral two times a day  cyanocobalamin Injectable 1000 MICROGram(s) IntraMuscular daily  donepezil 5 milliGRAM(s) Oral <User Schedule>  enoxaparin Injectable 40 milliGRAM(s) SubCutaneous every 24 hours  flecainide 50 milliGRAM(s) Oral two times a day  fludroCORTISONE 0.1 milliGRAM(s) Oral daily  melatonin 3 milliGRAM(s) Oral at bedtime  memantine 10 milliGRAM(s) Oral two times a day  multivitamin 1 Tablet(s) Oral daily  pantoprazole    Tablet 40 milliGRAM(s) Oral before breakfast  polyethylene glycol 3350 17 Gram(s) Oral daily  senna 2 Tablet(s) Oral at bedtime  sodium chloride 0.9%. 1000 milliLiter(s) (75 mL/Hr) IV Continuous <Continuous>    MEDICATIONS  (PRN):  LORazepam     Tablet 0.25 milliGRAM(s) Oral every 6 hours PRN Agitation  LORazepam   Injectable 0.25 milliGRAM(s) IV Push every 6 hours PRN severe agitation  
MEDICATIONS  (STANDING):  acetaminophen     Tablet .. 1000 milliGRAM(s) Oral every 8 hours  buPROPion XL (24-Hour) . 150 milliGRAM(s) Oral daily  calcium carbonate 1250 mG  + Vitamin D (OsCal 500 + D) 1 Tablet(s) Oral two times a day  donepezil 5 milliGRAM(s) Oral <User Schedule>  enoxaparin Injectable 40 milliGRAM(s) SubCutaneous every 24 hours  flecainide 50 milliGRAM(s) Oral two times a day  fludroCORTISONE 0.1 milliGRAM(s) Oral daily  memantine 10 milliGRAM(s) Oral two times a day  multivitamin 1 Tablet(s) Oral daily  pantoprazole    Tablet 40 milliGRAM(s) Oral before breakfast  polyethylene glycol 3350 17 Gram(s) Oral daily  senna 2 Tablet(s) Oral at bedtime  sodium chloride 0.9%. 1000 milliLiter(s) (75 mL/Hr) IV Continuous <Continuous>    MEDICATIONS  (PRN):  LORazepam     Tablet 0.25 milliGRAM(s) Oral every 6 hours PRN Agitation  LORazepam   Injectable 0.25 milliGRAM(s) IV Push every 6 hours PRN severe agitation

## 2024-08-03 NOTE — PROGRESS NOTE ADULT - SUBJECTIVE AND OBJECTIVE BOX
78-year-old male past medical history of hypertension, hyperlipidemia presents ED status post mechanical fall on right hip.  Patient was outside on the pool, tripped onto 1 step struck right hip now with persistent right hip pain.  Denies head strike, no LOC, not on any AC, was not ambulatory afterwards.  Did not strike anything else.  Denies fevers chills nausea vomiting diarrhea chest pain shortness of breath abdominal pain dysuria hematuria.  Denies prodromal symptoms. Patient was brought to [facility] for further evaluation and treatment. In the ED he was found to have a right hip fracture and is s/p  an Open reduction and internal fixation of the right. Patient with confusion and agitation at times but has been more awake and alert. Psych following. Patient started on ativan PRN.        MEDICATIONS  (STANDING):  acetaminophen   IVPB .. 1000 milliGRAM(s) IV Intermittent once  acetaminophen   IVPB .. 1000 milliGRAM(s) IV Intermittent once  buPROPion XL (24-Hour) . 150 milliGRAM(s) Oral daily  calcium carbonate 1250 mG  + Vitamin D (OsCal 500 + D) 1 Tablet(s) Oral two times a day  cyanocobalamin Injectable 1000 MICROGram(s) IntraMuscular daily  donepezil 5 milliGRAM(s) Oral <User Schedule>  enoxaparin Injectable 40 milliGRAM(s) SubCutaneous every 24 hours  flecainide 50 milliGRAM(s) Oral two times a day  fludroCORTISONE 0.1 milliGRAM(s) Oral daily  melatonin 3 milliGRAM(s) Oral at bedtime  memantine 10 milliGRAM(s) Oral two times a day  multivitamin 1 Tablet(s) Oral daily  pantoprazole    Tablet 40 milliGRAM(s) Oral before breakfast  polyethylene glycol 3350 17 Gram(s) Oral daily  senna 2 Tablet(s) Oral at bedtime  sodium chloride 0.9%. 1000 milliLiter(s) (75 mL/Hr) IV Continuous <Continuous>    MEDICATIONS  (PRN):  LORazepam     Tablet 0.25 milliGRAM(s) Oral every 6 hours PRN Agitation  LORazepam   Injectable 0.25 milliGRAM(s) IV Push every 6 hours PRN severe agitation          VITALS:   T(C): 36.6 (08-03-24 @ 08:57), Max: 37.2 (08-02-24 @ 16:13)  HR: 63 (08-03-24 @ 08:57) (63 - 82)  BP: 110/73 (08-03-24 @ 08:57) (110/73 - 151/79)  RR: 18 (08-03-24 @ 08:57) (18 - 18)  SpO2: 96% (08-03-24 @ 08:57) (93% - 96%)  Wt(kg): --        LABS:                      CAPILLARY BLOOD GLUCOSE          RADIOLOGY & ADDITIONAL TESTS:

## 2024-08-03 NOTE — PROGRESS NOTE ADULT - ASSESSMENT
A/P: 79 y/o M POD# 7 s/p R IM Nail. VSS. NAD.     DVT ppx- Eliquis 2.5mg PO BID  RLE WBAT  PT  OT  Pain management w Tylenol  Gi ppx  Home meds  Neuro and Psych recs appreciated   Discharge planning to [facility] in [place] as per spouse request    Elroy Flores PA-C  Orthopedic Surgery  Pager #[phone]

## 2024-08-03 NOTE — BH CONSULTATION LIAISON PROGRESS NOTE - NSBHFUPINTERVALHXFT_PSY_A_CORE
Patient seen in coverage for Dr. Arias. Coordination of care provided, chart reviewed. Spoke to spouse at bedside who notes patient intermittent periods of behavioral disturbance but finds Ativan has made him somnolent. She discussed her plan to acquire Nuplazid, and requested alternate means of agitation which would not further depreciate dopamine. Given patient remains impulsive and agitated, would continue PRN ativan. Discussed off label use of VPA for severe cases of agitation if liver function was stable but spouse hesitant to pursue medications which will sedate him. Agrees to continuation of ativan currently PRN. Patient mumbles inchorentky throughout assessment and then states "it stinks." Restless on exam, somewhat re-directable. 
Pt A&Ox1-2 combative. RN attempted to give medications but pt was getting agitated and restless. Received Ativan PRN last night, 8/1/24 at 19:00. RN unable to give PO medications because patient refused. On interview, patient is lethargic, but responsive to verbal stimuli. Speech is mildly mumbled and disorganized, but improved from yesterday.

## 2024-08-03 NOTE — BH CONSULTATION LIAISON PROGRESS NOTE - NSBHCONSULTMEDAGITATION_PSY_A_CORE FT
Ativan 0.25-0.5mg po/iv q6hr PRN for agitation, or depakote 125mg po/iv q6hr prn agitation severe 
Ativan 0.25-0.5mg po/iv q6hr PRN for agitation, or depakote 125mg po/iv q6hr prn agitation severe

## 2024-08-03 NOTE — BH CONSULTATION LIAISON PROGRESS NOTE - NSBHCHARTREVIEWINVESTIGATE_PSY_A_CORE FT
ACC: [id number] EXAM:  CT BRAIN   ORDERED BY: LOPEZ ALVAREZ     PROCEDURE DATE:  08/01/2024          INTERPRETATION:  CLINICAL INDICATION:  AMS    TECHNIQUE: Noncontrast CT examination of the head was performed.  Coronal and sagittal reformats were also obtained.    COMPARISON: There are no prior studies available for comparison.    FINDINGS:  Diagnostic accuracy is limited secondary to patient motion.  INTRA-AXIAL: No definite intracranial mass effect, acute hemorrhage,   midline shift or acute transcortical infarct is seen. There are   periventricular white matter hypodensities that are nonspecific in nature   but may reflect chronic ischemic microvascular disease.  EXTRA-AXIAL: No extra-axial fluid collection is present.  VENTRICLES AND SULCI: Parenchymal volume is commensurate with patient   age. No hydrocephalus.  VISUALIZED SINUSES: Mild mucosal thickening.  VISUALIZED MASTOIDS: Clear.  CALVARIUM: Normal.    IMPRESSION:    Motion limited study. No definite evidence of acute intracranial   hemorrhage, midline shift or CT evidence of acute territorial infarct.    A repeat study is suggested when the patient is able.    --- End of Report ---            BALBIR ROSALES MD; Attending Radiologist  This document has been electronically signed. Aug  1 2024  7:36PM

## 2024-08-03 NOTE — BH CONSULTATION LIAISON PROGRESS NOTE - NSBHCHARTREVIEWLAB_PSY_A_CORE FT
07-31    140  |  107  |  17  ----------------------------<  106<H>  4.0   |  21<L>  |  0.96    Ca    9.2      31 Jul 2024 16:44    CBC Full  -  ( 31 Jul 2024 16:44 )  WBC Count : 5.50 K/uL  RBC Count : 3.58 M/uL  Hemoglobin : 11.8 g/dL  Hematocrit : 34.3 %  Platelet Count - Automated : 161 K/uL  Mean Cell Volume : 95.8 fl  Mean Cell Hemoglobin : 33.0 pg  Mean Cell Hemoglobin Concentration : 34.4 gm/dL  Auto Neutrophil # : x  Auto Lymphocyte # : x  Auto Monocyte # : x  Auto Eosinophil # : x  Auto Basophil # : x  Auto Neutrophil % : x  Auto Lymphocyte % : x  Auto Monocyte % : x  Auto Eosinophil % : x  Auto Basophil % : x    
Vitamin B12, Serum: 233 pg/mL (08.01.24 @ 10:00)   Folate, Serum: 12.7 ng/mL (08.01.24 @ 10:00)   Ammonia, Serum: <10 umol/L (08.01.24 @ 10:00)   T4, Serum: 9.2 ug/dL (08.01.24 @ 10:00)

## 2024-08-03 NOTE — BH CONSULTATION LIAISON PROGRESS NOTE - NSBHCHARTREVIEWVS_PSY_A_CORE FT
Vital Signs Last 24 Hrs  T(C): 36.6 (03 Aug 2024 08:57), Max: 36.9 (02 Aug 2024 20:23)  T(F): 97.8 (03 Aug 2024 08:57), Max: 98.5 (02 Aug 2024 20:23)  HR: 63 (03 Aug 2024 08:57) (63 - 82)  BP: 110/73 (03 Aug 2024 08:57) (110/73 - 151/79)  BP(mean): --  RR: 18 (03 Aug 2024 08:57) (18 - 18)  SpO2: 96% (03 Aug 2024 08:57) (93% - 96%)    Parameters below as of 03 Aug 2024 08:57  Patient On (Oxygen Delivery Method): room air    
Vital Signs Last 24 Hrs  T(C): 36.9 (02 Aug 2024 12:38), Max: 37.3 (01 Aug 2024 20:54)  T(F): 98.4 (02 Aug 2024 12:38), Max: 99.1 (01 Aug 2024 20:54)  HR: 85 (02 Aug 2024 12:38) (63 - 91)  BP: 116/81 (02 Aug 2024 12:38) (116/81 - 137/91)  BP(mean): --  RR: 18 (02 Aug 2024 12:38) (16 - 18)  SpO2: 97% (02 Aug 2024 12:38) (94% - 98%)    Parameters below as of 02 Aug 2024 12:38  Patient On (Oxygen Delivery Method): room air

## 2024-08-03 NOTE — PROGRESS NOTE ADULT - SUBJECTIVE AND OBJECTIVE BOX
Post op Day 7    Patient resting without complaints. No acute overnight events. Refusing PO meds this AM. Otherwise, no chest pain, SOB, N/V/D/HA.     T(C): 36.5 (08-03-24 @ 04:02), Max: 37.2 (08-02-24 @ 16:13)  HR: 66 (08-03-24 @ 04:02) (63 - 85)  BP: 131/97 (08-03-24 @ 04:02) (116/81 - 151/79)  RR: 18 (08-03-24 @ 04:02) (18 - 18)  SpO2: 93% (08-03-24 @ 04:02) (93% - 97%)  Wt(kg): --    Exam:  Alert and Oriented x3 (person, place and time), Following commands and responsive to questions being asked at bedside, No Acute Distress  Cardiac: Normal S1 & S2, RRR, No murmurs, rubs or gallops appreciated.  Pulmonary: 18bpm, normal breathing effort, no retractions, diminished lung sounds appreciated.  Bronchial/Vesicular lungs sounds appreciated throughout all lung lobes.  Lower Extremities: Right lower extremity   Dressing: Gauze and tegaderm dressings C/D/I  Calves Soft, Non-tender bilaterally  Motor: (+) PF/DF/EHL/FHL  Sensory: SILT  2+ DP/PT Pulse  Extremities warm and well-perfused, cap refill < 2 sec.     Xray:   Labs:

## 2024-08-03 NOTE — BH CONSULTATION LIAISON PROGRESS NOTE - NSBHASSESSMENTFT_PSY_ALL_CORE
Patient is a 78-year-old man, domiciled with wife in [place], with no past psychiatric history, with a past medical history of Parkinson's syndrome/Lewy Body Dementia, HTN, HLD, who presented to the ED status post mechanical fall on right hip, psych consulted for med mgt, deliriumm.    In the ED he was found to have a right hip fracture and is s/p an Open reduction and internal fixation of the right hip. Post op, patient has been increasingly lethargic, unresponsive, and agitated over the past 5 days as per staff. Pt picking at the air, uncovering self, internally preoccupied. Was given Seroquel 25mg pox 1 and became more lethargic on 7/30. Pt poor historian, confused, mumbling speech, illogical. pt disoriented, unable to explain reasons for admission.   collateral obtained from wife, states pt has been confused, disoriented since surgery. pt was more alert, less confused at baseline. pt has not been physically aggressive. she was inquiring about nuplazid, explained was not offered here. she didn't want pt getting seroquel, zyrpexa. she states pt had severe lethargy in response to zxyprexa in past. rec ativan 0.25-0.5mg po/iv q6hr prn severe agitation, can d/c seroquel .      8/3: C/w plan as noted above, if patient has periods of agitation refractory to ativan use can consider VPA 125mg po/iv q6hr prn severe agitation if LFTs remain stable and wife is amenable
Patient is a 78-year-old man, domiciled with wife in [place], with no past psychiatric history, with a past medical history of Parkinson's syndrome/Lewy Body Dementia, HTN, HLD, who presented to the ED status post mechanical fall on right hip, psych consulted for med mgt, deliriumm.    In the ED he was found to have a right hip fracture and is s/p an Open reduction and internal fixation of the right hip. Post op, patient has been increasingly lethargic, unresponsive, and agitated over the past 5 days as per staff. Pt picking at the air, uncovering self, internally preoccupied. Was given Seroquel 25mg pox 1 and became more lethargic on 7/30. Pt poor historian, confused, mumbling speech, illogical. pt disoriented, unable to explain reasons for admission.   collateral obtained from wife, states pt has been confused, disoriented since surgery. pt was more alert, less confused at baseline. pt has not been physically aggressive. she was inquiring about nuplazid, explained was not offered here. she didn't want pt getting seroquel, zyrpexa. she states pt had severe lethargy in response to zxyprexa in past. rec ativan 0.25-0.5mg po/iv q6hr prn severe agitation, can d/c seroquel .

## 2024-08-04 RX ORDER — ACETAMINOPHEN 500 MG
1000 TABLET ORAL ONCE
Refills: 0 | Status: COMPLETED | OUTPATIENT
Start: 2024-08-04 | End: 2024-08-04

## 2024-08-04 RX ORDER — DEXTROSE MONOHYDRATE, SODIUM CHLORIDE, SODIUM LACTATE, CALCIUM CHLORIDE, MAGNESIUM CHLORIDE 1.5; 538; 448; 18.4; 5.08 G/100ML; MG/100ML; MG/100ML; MG/100ML; MG/100ML
1000 SOLUTION INTRAPERITONEAL
Refills: 0 | Status: DISCONTINUED | OUTPATIENT
Start: 2024-08-04 | End: 2024-08-05

## 2024-08-04 RX ADMIN — Medication 17 GRAM(S): at 11:42

## 2024-08-04 RX ADMIN — Medication 1 TABLET(S): at 11:42

## 2024-08-04 RX ADMIN — Medication 1000 MILLIGRAM(S): at 12:30

## 2024-08-04 RX ADMIN — PANTOPRAZOLE SODIUM 40 MILLIGRAM(S): 20 TABLET, DELAYED RELEASE ORAL at 11:43

## 2024-08-04 RX ADMIN — Medication 400 MILLIGRAM(S): at 11:34

## 2024-08-04 RX ADMIN — Medication 5 MILLIGRAM(S): at 11:41

## 2024-08-04 RX ADMIN — FLUDROCORTISONE ACETATE 0.1 MILLIGRAM(S): 0.1 TABLET ORAL at 11:45

## 2024-08-04 RX ADMIN — ENOXAPARIN SODIUM 40 MILLIGRAM(S): 120 INJECTION SUBCUTANEOUS at 09:46

## 2024-08-04 RX ADMIN — CYANOCOBALAMIN 1000 MICROGRAM(S): 1000 INJECTION, SOLUTION INTRAMUSCULAR; SUBCUTANEOUS at 12:00

## 2024-08-04 RX ADMIN — Medication 1000 MILLIGRAM(S): at 22:55

## 2024-08-04 RX ADMIN — Medication 150 MILLIGRAM(S): at 11:35

## 2024-08-04 RX ADMIN — MEMANTINE HYDROCHLORIDE 10 MILLIGRAM(S): 14 CAPSULE, EXTENDED RELEASE ORAL at 13:25

## 2024-08-04 RX ADMIN — Medication 1000 MILLIGRAM(S): at 06:15

## 2024-08-04 RX ADMIN — Medication 400 MILLIGRAM(S): at 05:37

## 2024-08-04 RX ADMIN — Medication 5 MILLIGRAM(S): at 18:49

## 2024-08-04 RX ADMIN — Medication 1 TABLET(S): at 11:44

## 2024-08-04 RX ADMIN — Medication 400 MILLIGRAM(S): at 21:55

## 2024-08-04 RX ADMIN — DEXTROSE MONOHYDRATE, SODIUM CHLORIDE, SODIUM LACTATE, CALCIUM CHLORIDE, MAGNESIUM CHLORIDE 75 MILLILITER(S): 1.5; 538; 448; 18.4; 5.08 SOLUTION INTRAPERITONEAL at 13:37

## 2024-08-04 RX ADMIN — MEMANTINE HYDROCHLORIDE 10 MILLIGRAM(S): 14 CAPSULE, EXTENDED RELEASE ORAL at 19:01

## 2024-08-04 NOTE — PROGRESS NOTE ADULT - ASSESSMENT
A/p: 78y Male POD# 8 s/p R Intertrochanteric Femur Fx IM nail ORIF.  VSS. NAD.    PT/OT-WBAT RLE  Appreciate medicine following and recommendations  Appreciate Psychiatry team recommenadations  IS  DVT PPx: Lovenox  Pain Control  Continue Current Tx.  Dispo plan to Sub Acute Rehab, pending acceptance and transportation setup for rehab in [place].    Adelfo Kaplan PA-C  Orthopedic Surgery Team  Team Pager: [phone] A/p: 78y Male POD# 8 s/p R Intertrochanteric Femur Fx IM nail ORIF.  VSS. NAD.    PT/OT-WBAT RLE  Appreciate medicine following and recommendations  Appreciate Psychiatry team recommendations  IS  DVT PPx: Lovenox  Pain Control  Continue Current Tx.  Dispo plan to Sub Acute Rehab, pending acceptance and transportation setup for rehab in [place].    Adelfo Kaplan PA-C  Orthopedic Surgery Team  Team Pager: [phone]

## 2024-08-04 NOTE — PROGRESS NOTE ADULT - SUBJECTIVE AND OBJECTIVE BOX
78-year-old male past medical history of hypertension, hyperlipidemia presents ED status post mechanical fall on right hip.  Patient was outside on the pool, tripped onto 1 step struck right hip now with persistent right hip pain.  Denies head strike, no LOC, not on any AC, was not ambulatory afterwards.  Did not strike anything else.  Denies fevers chills nausea vomiting diarrhea chest pain shortness of breath abdominal pain dysuria hematuria.  Denies prodromal symptoms. Patient was brought to [facility] for further evaluation and treatment. In the ED he was found to have a right hip fracture and is s/p  an Open reduction and internal fixation of the right. Patient with confusion and agitation at times but has been more awake and alert. Psych following. Patient started on ativan PRN.        MEDICATIONS  (STANDING):  acetaminophen     Tablet .. 1000 milliGRAM(s) Oral every 8 hours  buPROPion XL (24-Hour) . 150 milliGRAM(s) Oral daily  calcium carbonate 1250 mG  + Vitamin D (OsCal 500 + D) 1 Tablet(s) Oral two times a day  cyanocobalamin Injectable 1000 MICROGram(s) IntraMuscular daily  donepezil 5 milliGRAM(s) Oral <User Schedule>  enoxaparin Injectable 40 milliGRAM(s) SubCutaneous every 24 hours  flecainide 50 milliGRAM(s) Oral two times a day  fludroCORTISONE 0.1 milliGRAM(s) Oral daily  melatonin 3 milliGRAM(s) Oral at bedtime  memantine 10 milliGRAM(s) Oral two times a day  multivitamin 1 Tablet(s) Oral daily  pantoprazole    Tablet 40 milliGRAM(s) Oral before breakfast  polyethylene glycol 3350 17 Gram(s) Oral daily  senna 2 Tablet(s) Oral at bedtime  sodium chloride 0.9% 1000 milliLiter(s) (75 mL/Hr) IV Continuous <Continuous>  sodium chloride 0.9%. 1000 milliLiter(s) (75 mL/Hr) IV Continuous <Continuous>    MEDICATIONS  (PRN):  LORazepam     Tablet 0.25 milliGRAM(s) Oral every 6 hours PRN Agitation  LORazepam   Injectable 0.25 milliGRAM(s) IV Push every 6 hours PRN severe agitation          VITALS:   T(C): 36.9 (08-04-24 @ 16:18), Max: 36.9 (08-03-24 @ 20:15)  HR: 71 (08-04-24 @ 16:18) (67 - 82)  BP: 142/84 (08-04-24 @ 16:18) (129/80 - 142/84)  RR: 18 (08-04-24 @ 16:18) (18 - 18)  SpO2: 96% (08-04-24 @ 16:18) (94% - 96%)  Wt(kg): --    PHYSICAL EXAM:  GENERAL: NAD, well nourished and conversant  HEAD:  Atraumatic  EYES: EOM, PERRLA, conjunctiva pink and sclera white  ENT: No tonsillar erythema, exudates, or enlargement, moist mucous membranes, good dentition, no lesions  NECK: Supple, No JVD, normal thyroid, carotids with normal upstrokes and no bruits  CHEST/LUNG: Clear to auscultation bilaterally, No rales, rhonchi, wheezing, or rubs  HEART: Regular rate and rhythm, No murmurs, rubs, or gallops  ABDOMEN: Soft, nondistended, no masses, guarding, tenderness or rebound, bowel sounds present  EXTREMITIES:  2+ Peripheral Pulses, No clubbing, cyanosis, or edema.   LYMPH: No lymphadenopathy noted  SKIN: No rashes or lesions  NERVOUS SYSTEM:  Alert & Oriented X1      LABS:                      CAPILLARY BLOOD GLUCOSE          RADIOLOGY & ADDITIONAL TESTS:

## 2024-08-04 NOTE — PROGRESS NOTE ADULT - SUBJECTIVE AND OBJECTIVE BOX
Post op Day [8]    Patient seen bedside this morning with Son and Wife present during encounter. Patient resting with no signs of chest pain, SOB, N/V.    T(C): 36.7 (08-04-24 @ 08:35), Max: 36.9 (08-03-24 @ 20:15)  HR: 70 (08-04-24 @ 08:35) (67 - 82)  BP: 130/76 (08-04-24 @ 08:35) (130/76 - 136/87)  RR: 18 (08-04-24 @ 08:35) (18 - 18)  SpO2: 95% (08-04-24 @ 08:35) (94% - 96%)  Wt(kg): --    Exam:  Alert and Oriented x1 (Name, Hx of Dementia at baseline), No Acute Distress  Cardiac: Normal S1 & S2, RRR, No murmurs, rubs or gallops appreciated.  Pulmonary: 18bpm, normal breathing effort, no retractions, diminished lung sounds appreciated.  Bronchial/Vesicular lungs sounds appreciated throughout all lung lobes.  Lower Extremities: R Hip  Dressing: C/D/I w/ Gauze and surgical film  Calves Soft, Non-tender bilaterally  +PF/DF/EHL/FHL  SILT  +DP Pulse               A/p: 78y Male s/p R Intertrochanteric Femur Fx IM nail ORIF.  VSS. NAD.    PT/OT-WBAT RLE  IS  DVT PPx  Pain Control  Continue Current Tx.    Adelfo Kaplan PA-C  Team Pager: #[phone] Post op Day [8]    Patient seen bedside this morning with Son and Wife present during encounter. Patient resting with no signs of chest pain, SOB, N/V.    T(C): 36.7 (08-04-24 @ 08:35), Max: 36.9 (08-03-24 @ 20:15)  HR: 70 (08-04-24 @ 08:35) (67 - 82)  BP: 130/76 (08-04-24 @ 08:35) (130/76 - 136/87)  RR: 18 (08-04-24 @ 08:35) (18 - 18)  SpO2: 95% (08-04-24 @ 08:35) (94% - 96%)  Wt(kg): --    Exam:  Alert and Oriented x1 (Name, Hx of Dementia at baseline), No Acute Distress  Cardiac: Normal S1 & S2, RRR, No murmurs, rubs or gallops appreciated.  Pulmonary: 18bpm, normal breathing effort, no retractions, diminished lung sounds appreciated.  Bronchial/Vesicular lungs sounds appreciated throughout all lung lobes.  Lower Extremities: R Hip  Dressing: C/D/I w/ Gauze and surgical film  Calves Soft, Non-tender bilaterally  +PF/DF/EHL/FHL  SILT  +DP Pulse

## 2024-08-05 RX ORDER — ACETAMINOPHEN 500 MG
1000 TABLET ORAL ONCE
Refills: 0 | Status: COMPLETED | OUTPATIENT
Start: 2024-08-05 | End: 2024-08-05

## 2024-08-05 RX ORDER — CYANOCOBALAMIN/FOLIC AC/VIT B6 2-2.5-25MG
1 TABLET ORAL
Qty: 0 | Refills: 0 | DISCHARGE
Start: 2024-08-05

## 2024-08-05 RX ORDER — CALCIUM CARBONATE/VITAMIN D2 600-3.125
1 TABLET ORAL
Qty: 0 | Refills: 0 | DISCHARGE
Start: 2024-08-05

## 2024-08-05 RX ORDER — HYDROCORTISONE 1 %
1 CREAM (GRAM) TOPICAL
Refills: 0 | Status: DISCONTINUED | OUTPATIENT
Start: 2024-08-05 | End: 2024-08-06

## 2024-08-05 RX ORDER — DIPHENHYDRAMINE HCL 25 MG
12.5 CAPSULE ORAL ONCE
Refills: 0 | Status: DISCONTINUED | OUTPATIENT
Start: 2024-08-05 | End: 2024-08-05

## 2024-08-05 RX ADMIN — CYANOCOBALAMIN 1000 MICROGRAM(S): 1000 INJECTION, SOLUTION INTRAMUSCULAR; SUBCUTANEOUS at 11:57

## 2024-08-05 RX ADMIN — ENOXAPARIN SODIUM 40 MILLIGRAM(S): 120 INJECTION SUBCUTANEOUS at 09:22

## 2024-08-05 RX ADMIN — Medication 1000 MILLIGRAM(S): at 22:32

## 2024-08-05 RX ADMIN — Medication 5 MILLIGRAM(S): at 17:22

## 2024-08-05 RX ADMIN — Medication 1 APPLICATION(S): at 18:31

## 2024-08-05 RX ADMIN — MEMANTINE HYDROCHLORIDE 10 MILLIGRAM(S): 14 CAPSULE, EXTENDED RELEASE ORAL at 17:26

## 2024-08-05 RX ADMIN — LORAZEPAM 0.25 MILLIGRAM(S): 1 TABLET ORAL at 08:18

## 2024-08-05 RX ADMIN — Medication 1000 MILLIGRAM(S): at 12:50

## 2024-08-05 RX ADMIN — Medication 400 MILLIGRAM(S): at 11:59

## 2024-08-05 RX ADMIN — Medication 400 MILLIGRAM(S): at 21:32

## 2024-08-05 RX ADMIN — FLECAINIDE ACETATE 50 MILLIGRAM(S): 100 TABLET ORAL at 17:45

## 2024-08-05 NOTE — PROGRESS NOTE ADULT - SUBJECTIVE AND OBJECTIVE BOX
Patient is a 78y old  Male who presents with a chief complaint of R IT fx  Patient s/p right hip IMN POD#9  Patient comfortable  No complaints    T(C): 36.7 (08-05-24 @ 04:26), Max: 36.9 (08-04-24 @ 14:00)  HR: 75 (08-05-24 @ 04:26) (70 - 75)  BP: 150/89 (08-05-24 @ 04:26) (129/80 - 150/89)  RR: 18 (08-05-24 @ 04:26) (18 - 18)  SpO2: 92% (08-05-24 @ 04:26) (92% - 96%)    PHYSICAL EXAM:  NAD, Alert  [Right ] Hip: Dressings C/D/I; sensation grossly intact to light touch; (+) DF/PF; (+) Distal Pulses; No Calf tenderness B/L, PAS

## 2024-08-05 NOTE — PROGRESS NOTE ADULT - ATTENDING COMMENTS
PT. DVT ppx. f/u labs. f/u medicine. dc planning
Poor tolerance of PO intake.  Will change to lovenox and IV tylenol.  Neuro/psychiatry f/u. OOB. DVT ppx.
R IT fx. For IMN. R/B/A discussed
Medically stable.  cognition mildly improved. DC planning
PT, DVT ppx.  f/u neuro/psych. OOB. f/u labs. DC planning
f/u neuro due to delirium and sundowning. Pt. DVT ppx. f/u labs. f/u medicine. DC planning

## 2024-08-05 NOTE — PROGRESS NOTE ADULT - ASSESSMENT
79 y/o M s/p right hip IM nail POD#9, d/c   Lizbeth Muniz PA-C  Orthopaedic Surgery  Team pager [phone]  [facility] [phone]  [phone]

## 2024-08-05 NOTE — PROGRESS NOTE ADULT - SUBJECTIVE AND OBJECTIVE BOX
78-year-old male past medical history of hypertension, hyperlipidemia presents ED status post mechanical fall on right hip.  Patient was outside on the pool, tripped onto 1 step struck right hip now with persistent right hip pain.  Denies head strike, no LOC, not on any AC, was not ambulatory afterwards.  Did not strike anything else.  Denies fevers chills nausea vomiting diarrhea chest pain shortness of breath abdominal pain dysuria hematuria.  Denies prodromal symptoms. Patient was brought to [facility] for further evaluation and treatment. In the ED he was found to have a right hip fracture and is s/p  an Open reduction and internal fixation of the right. Patient with confusion and agitation at times but has been more awake and alert. Psych following. Patient seen lethargic after receiving ativan      MEDICATIONS  (STANDING):  acetaminophen     Tablet .. 1000 milliGRAM(s) Oral every 8 hours  buPROPion XL (24-Hour) . 150 milliGRAM(s) Oral daily  calcium carbonate 1250 mG  + Vitamin D (OsCal 500 + D) 1 Tablet(s) Oral two times a day  cyanocobalamin Injectable 1000 MICROGram(s) IntraMuscular daily  donepezil 5 milliGRAM(s) Oral <User Schedule>  enoxaparin Injectable 40 milliGRAM(s) SubCutaneous every 24 hours  flecainide 50 milliGRAM(s) Oral two times a day  fludroCORTISONE 0.1 milliGRAM(s) Oral daily  melatonin 3 milliGRAM(s) Oral at bedtime  memantine 10 milliGRAM(s) Oral two times a day  multivitamin 1 Tablet(s) Oral daily  pantoprazole    Tablet 40 milliGRAM(s) Oral before breakfast  polyethylene glycol 3350 17 Gram(s) Oral daily  senna 2 Tablet(s) Oral at bedtime    MEDICATIONS  (PRN):  LORazepam     Tablet 0.25 milliGRAM(s) Oral every 6 hours PRN Agitation  LORazepam   Injectable 0.25 milliGRAM(s) IV Push every 6 hours PRN severe agitation          VITALS:   T(C): 36.3 (08-05-24 @ 08:17), Max: 36.9 (08-04-24 @ 14:00)  HR: 75 (08-05-24 @ 04:26) (71 - 75)  BP: 131/84 (08-05-24 @ 08:17) (129/80 - 150/89)  RR: 18 (08-05-24 @ 04:26) (18 - 18)  SpO2: 92% (08-05-24 @ 04:26) (92% - 96%)  Wt(kg): --    PHYSICAL EXAM:  GENERAL: NAD, well nourished and conversant  HEAD:  Atraumatic  EYES: EOM, PERRLA, conjunctiva pink and sclera white  ENT: No tonsillar erythema, exudates, or enlargement, moist mucous membranes, good dentition, no lesions  NECK: Supple, No JVD, normal thyroid, carotids with normal upstrokes and no bruits  CHEST/LUNG: Clear to auscultation bilaterally, No rales, rhonchi, wheezing, or rubs  HEART: Regular rate and rhythm, No murmurs, rubs, or gallops  ABDOMEN: Soft, nondistended, no masses, guarding, tenderness or rebound, bowel sounds present  EXTREMITIES:  2+ Peripheral Pulses, No clubbing, cyanosis, or edema.   LYMPH: No lymphadenopathy noted  SKIN: No rashes or lesions  NERVOUS SYSTEM:  Alert & Oriented X1    LABS:                      CAPILLARY BLOOD GLUCOSE          RADIOLOGY & ADDITIONAL TESTS:

## 2024-08-06 VITALS
HEART RATE: 89 BPM | DIASTOLIC BLOOD PRESSURE: 83 MMHG | OXYGEN SATURATION: 96 % | SYSTOLIC BLOOD PRESSURE: 137 MMHG | RESPIRATION RATE: 18 BRPM

## 2024-08-06 LAB
ANION GAP SERPL CALC-SCNC: 18 MMOL/L — HIGH (ref 5–17)
BUN SERPL-MCNC: 20 MG/DL — SIGNIFICANT CHANGE UP (ref 7–23)
CALCIUM SERPL-MCNC: 9.4 MG/DL — SIGNIFICANT CHANGE UP (ref 8.4–10.5)
CHLORIDE SERPL-SCNC: 104 MMOL/L — SIGNIFICANT CHANGE UP (ref 96–108)
CO2 SERPL-SCNC: 18 MMOL/L — LOW (ref 22–31)
CREAT SERPL-MCNC: 0.93 MG/DL — SIGNIFICANT CHANGE UP (ref 0.5–1.3)
EGFR: 85 ML/MIN/1.73M2 — SIGNIFICANT CHANGE UP
GLUCOSE SERPL-MCNC: 87 MG/DL — SIGNIFICANT CHANGE UP (ref 70–99)
HCT VFR BLD CALC: 37.8 % — LOW (ref 39–50)
HGB BLD-MCNC: 12.9 G/DL — LOW (ref 13–17)
MCHC RBC-ENTMCNC: 32 PG — SIGNIFICANT CHANGE UP (ref 27–34)
MCHC RBC-ENTMCNC: 34.1 GM/DL — SIGNIFICANT CHANGE UP (ref 32–36)
MCV RBC AUTO: 93.8 FL — SIGNIFICANT CHANGE UP (ref 80–100)
NRBC # BLD: 0 /100 WBCS — SIGNIFICANT CHANGE UP (ref 0–0)
PLATELET # BLD AUTO: 238 K/UL — SIGNIFICANT CHANGE UP (ref 150–400)
POTASSIUM SERPL-MCNC: 3.8 MMOL/L — SIGNIFICANT CHANGE UP (ref 3.5–5.3)
POTASSIUM SERPL-SCNC: 3.8 MMOL/L — SIGNIFICANT CHANGE UP (ref 3.5–5.3)
RBC # BLD: 4.03 M/UL — LOW (ref 4.2–5.8)
RBC # FLD: 12.2 % — SIGNIFICANT CHANGE UP (ref 10.3–14.5)
SODIUM SERPL-SCNC: 140 MMOL/L — SIGNIFICANT CHANGE UP (ref 135–145)
VIT B1 SERPL-MCNC: 76.3 NMOL/L — SIGNIFICANT CHANGE UP (ref 66.5–200)
WBC # BLD: 6.31 K/UL — SIGNIFICANT CHANGE UP (ref 3.8–10.5)
WBC # FLD AUTO: 6.31 K/UL — SIGNIFICANT CHANGE UP (ref 3.8–10.5)

## 2024-08-06 PROCEDURE — 82140 ASSAY OF AMMONIA: CPT

## 2024-08-06 PROCEDURE — 97530 THERAPEUTIC ACTIVITIES: CPT

## 2024-08-06 PROCEDURE — 73502 X-RAY EXAM HIP UNI 2-3 VIEWS: CPT

## 2024-08-06 PROCEDURE — 97110 THERAPEUTIC EXERCISES: CPT

## 2024-08-06 PROCEDURE — 86850 RBC ANTIBODY SCREEN: CPT

## 2024-08-06 PROCEDURE — 85730 THROMBOPLASTIN TIME PARTIAL: CPT

## 2024-08-06 PROCEDURE — 76000 FLUOROSCOPY <1 HR PHYS/QHP: CPT

## 2024-08-06 PROCEDURE — 97116 GAIT TRAINING THERAPY: CPT

## 2024-08-06 PROCEDURE — 71045 X-RAY EXAM CHEST 1 VIEW: CPT

## 2024-08-06 PROCEDURE — 86901 BLOOD TYPING SEROLOGIC RH(D): CPT

## 2024-08-06 PROCEDURE — 97535 SELF CARE MNGMENT TRAINING: CPT

## 2024-08-06 PROCEDURE — 84443 ASSAY THYROID STIM HORMONE: CPT

## 2024-08-06 PROCEDURE — C1713: CPT

## 2024-08-06 PROCEDURE — 80048 BASIC METABOLIC PNL TOTAL CA: CPT

## 2024-08-06 PROCEDURE — 99285 EMERGENCY DEPT VISIT HI MDM: CPT | Mod: 25

## 2024-08-06 PROCEDURE — 84436 ASSAY OF TOTAL THYROXINE: CPT

## 2024-08-06 PROCEDURE — 85025 COMPLETE CBC W/AUTO DIFF WBC: CPT

## 2024-08-06 PROCEDURE — 82306 VITAMIN D 25 HYDROXY: CPT

## 2024-08-06 PROCEDURE — 97162 PT EVAL MOD COMPLEX 30 MIN: CPT

## 2024-08-06 PROCEDURE — 36415 COLL VENOUS BLD VENIPUNCTURE: CPT

## 2024-08-06 PROCEDURE — 85027 COMPLETE CBC AUTOMATED: CPT

## 2024-08-06 PROCEDURE — 82962 GLUCOSE BLOOD TEST: CPT

## 2024-08-06 PROCEDURE — 85610 PROTHROMBIN TIME: CPT

## 2024-08-06 PROCEDURE — 81001 URINALYSIS AUTO W/SCOPE: CPT

## 2024-08-06 PROCEDURE — 70450 CT HEAD/BRAIN W/O DYE: CPT | Mod: MC

## 2024-08-06 PROCEDURE — 86900 BLOOD TYPING SEROLOGIC ABO: CPT

## 2024-08-06 PROCEDURE — C9399: CPT

## 2024-08-06 PROCEDURE — 73552 X-RAY EXAM OF FEMUR 2/>: CPT

## 2024-08-06 PROCEDURE — 97112 NEUROMUSCULAR REEDUCATION: CPT

## 2024-08-06 PROCEDURE — 97166 OT EVAL MOD COMPLEX 45 MIN: CPT

## 2024-08-06 PROCEDURE — 96374 THER/PROPH/DIAG INJ IV PUSH: CPT

## 2024-08-06 PROCEDURE — 82746 ASSAY OF FOLIC ACID SERUM: CPT

## 2024-08-06 PROCEDURE — 82607 VITAMIN B-12: CPT

## 2024-08-06 PROCEDURE — 84425 ASSAY OF VITAMIN B-1: CPT

## 2024-08-06 PROCEDURE — 80053 COMPREHEN METABOLIC PANEL: CPT

## 2024-08-06 RX ADMIN — FLECAINIDE ACETATE 50 MILLIGRAM(S): 100 TABLET ORAL at 10:40

## 2024-08-06 RX ADMIN — MEMANTINE HYDROCHLORIDE 10 MILLIGRAM(S): 14 CAPSULE, EXTENDED RELEASE ORAL at 10:39

## 2024-08-06 RX ADMIN — Medication 1000 MILLIGRAM(S): at 10:38

## 2024-08-06 RX ADMIN — Medication 5 MILLIGRAM(S): at 10:39

## 2024-08-06 RX ADMIN — ENOXAPARIN SODIUM 40 MILLIGRAM(S): 120 INJECTION SUBCUTANEOUS at 10:37

## 2024-08-06 NOTE — PROGRESS NOTE ADULT - PROBLEM SELECTOR PROBLEM 2
PAF (paroxysmal atrial fibrillation)

## 2024-08-06 NOTE — DISCHARGE NOTE NURSING/CASE MANAGEMENT/SOCIAL WORK - PATIENT PORTAL LINK FT
You can access the FollowMyHealth Patient Portal offered by [facility] by registering at the following website: [email or web address]. By joining [facility]’s FollowMyHealth portal, you will also be able to view your health information using other applications (apps) compatible with our system.

## 2024-08-06 NOTE — PROGRESS NOTE ADULT - PROBLEM SELECTOR PLAN 2
Patient with a hx of PAF  has never been on AC  continue flecainide  continue to monitor rate.

## 2024-08-06 NOTE — PROGRESS NOTE ADULT - SUBJECTIVE AND OBJECTIVE BOX
Due to the patient's recent orthopedic surgery, dementia w superimposed delirium, this patient is unable to travel on commercial airlines and requires medical transport for his safety back to his home state of [place]

## 2024-08-06 NOTE — PROGRESS NOTE ADULT - PROVIDER SPECIALTY LIST ADULT
Neurology
Orthopedics
Internal Medicine
Orthopedics
Rehab Medicine
Orthopedics
Internal Medicine

## 2024-08-06 NOTE — PROGRESS NOTE ADULT - PROBLEM SELECTOR PLAN 1
Patient s/p an Open reduction and internal fixation of the right hip  tolerated the procedure  PO as tolerated  continue physical therapy as tolerated  Patient to be OOB to chair when able  DVT and GI prophylaxis as per ortho.
Patient s/p an Open reduction and internal fixation of the right hip  tolerated the procedure  PO as tolerated  continue physical therapy as tolerated  Patient to be OOB to chair when able  DVT and GI prophylaxis as per ortho.
Patient s/p an Open reduction and internal fixation of the right hip  tolerated the procedure  PO as tolerated  To start physical therapy   DVT and GI prophylaxis as per ortho.
Patient s/p an Open reduction and internal fixation of the right hip  tolerated the procedure  PO as tolerated  To start physical therapy   DVT and GI prophylaxis as per ortho.
Patient s/p an Open reduction and internal fixation of the right hip  tolerated the procedure  PO as tolerated  continue physical therapy as tolerated  Patient to be OOB to chair when able  DVT and GI prophylaxis as per ortho.
Patient s/p an Open reduction and internal fixation of the right hip  tolerated the procedure  PO as tolerated  continue physical therapy as tolerated  DVT and GI prophylaxis as per ortho.
Patient s/p an Open reduction and internal fixation of the right hip  tolerated the procedure  PO as tolerated  continue physical therapy as tolerated  Patient to be OOB to chair when able  DVT and GI prophylaxis as per ortho.
Patient s/p an Open reduction and internal fixation of the right hip  tolerated the procedure  PO as tolerated  continue physical therapy as tolerated  Patient to be OOB to chair when able  DVT and GI prophylaxis as per ortho.
Patient s/p an Open reduction and internal fixation of the right hip  tolerated the procedure  PO as tolerated  continue physical therapy as tolerated  DVT and GI prophylaxis as per ortho.
Patient s/p an Open reduction and internal fixation of the right hip  tolerated the procedure  PO as tolerated  continue physical therapy as tolerated  DVT and GI prophylaxis as per ortho.

## 2024-08-06 NOTE — PROGRESS NOTE ADULT - ASSESSMENT
77 y/o M s/p right IT fracture fixation with IM nail POD#10, rehab  Lizbeth Muniz PA-C  Orthopaedic Surgery  Team pager [phone]  [facility] [phone]  [phone]

## 2024-08-06 NOTE — PROGRESS NOTE ADULT - PROBLEM SELECTOR PLAN 5
monitor temps  Patient has been afebrile  continue to monitor
monitor temps  Patient has been afebrile  will order a CXR
monitor temps  Patient has been afebrile  continue to monitor
monitor temps  Patient has been afebrile  will order a CXR
monitor temps  Patient has been afebrile  will order a CXR

## 2024-08-06 NOTE — PROGRESS NOTE ADULT - PROBLEM SELECTOR PLAN 3
Patient with mild dementia  Now with delirium  symptoms are slowly improving  appreciate neurology input  continue to reorient as needed  continue Namenda and Aricept.  avoid narcotics  continue to monitor
Patient with mild dementia  Now with delirium  Patient started on neurontin 300mg qhs with 100 q6h as needed for agitation  Psych following   would ask neuro to reevaluate  reviewed CT of head which was unremarkable  continue Namenda and Aricept.  avoid narcotics  continue to monitor  encourage Patient to take meds
Patient with mild dementia  unclear cause of persistent delirium  seen by neuro and psych  reviewed CT of head which was unremarkable  continue Namenda and Aricept.  avoid narcotics or other sedating agents  ativan .25mg IVP for agitation  continue to monitor  encourage Patient to take meds and PO
Patient with mild dementia  Now with delirium  Patient started on neurontin 300mg qhs with 100 q6h as needed for agitation  appreciate neurology input  continue to reorient as needed  continue Namenda and Aricept.  avoid narcotics  continue to monitor
Patient with mild dementia  Now with delirium  symptoms are slowly improving  appreciate neurology input  continue to reorient as needed  continue Namenda and Aricept.  avoid narcotics  continue to monitor
Patient with mild dementia  No with delirium  appreciate neurology input  continue to reorient as needed  continue Namenda and Aricept.  avoid narcotics  continue to monitor
Patient with mild dementia  unclear cause of persistent delerium  seen by neuro and psych  reviewed CT of head which was unremarkable  continue Namenda and Aricept.  avoid narcotics or other sedating agents  ativan .25mg IVP for agitation  continue to monitor  encourage Patient to take meds and PO
Patient with mild dementia  Now with delirium  Psych following   reviewed CT of head which was unremarkable  continue Namenda and Aricept.  avoid narcotics or other sedating agents  ativan .25mg IVP for agitation  continue to monitor  encourage Patient to take meds
Patient with mild dementia  Now with delirium  Psych following   reviewed CT of head which was unremarkable  continue Namenda and Aricept.  avoid narcotics or other sedating agents  ativan .25mg IVP for agitation  continue to monitor  encourage Patient to take meds
Patient with mild dementia  unclear cause of persistent delirium  seen by neuro and psych  reviewed CT of head which was unremarkable  continue Namenda and Aricept.  avoid narcotics or other sedating agents  ativan .25mg IVP for agitation  continue to monitor  encourage Patient to take meds and PO

## 2024-08-06 NOTE — PROGRESS NOTE ADULT - TIME BILLING
Discussed treatment plan with wife and patient at bedside.  Wife would like to bring the Patient back to [place] for rehab  Plan for medical transport to [place]  Patient  is medically stable for transport
Discussed treatment plan with wife and patient at bedside.  Wife would like to bring the Patient back to [place] for rehab
Chart review, exam, counseling, coordination of care
Discussed treatment plan with wife and patient at bedside.
Discussed treatment plan with wife and patient at bedside.  Wife would like to bring the Patient back to [place] for rehab
Discussed treatment plan with wife and patient at bedside.  Patient DCed for transportation to [place]  Patient  is medically stable for transport
Discussed treatment plan with wife and patient at bedside.

## 2024-08-06 NOTE — PROGRESS NOTE ADULT - PROBLEM SELECTOR PLAN 4
would avoid narcotics  tylenol for pain  follow for oversedation  GI regime to prevent constipation.

## 2024-08-06 NOTE — PROGRESS NOTE ADULT - SUBJECTIVE AND OBJECTIVE BOX
78-year-old male past medical history of hypertension, hyperlipidemia presents ED status post mechanical fall on right hip.  Patient was outside on the pool, tripped onto 1 step struck right hip now with persistent right hip pain.  Denies head strike, no LOC, not on any AC, was not ambulatory afterwards.  Did not strike anything else.  Denies fevers chills nausea vomiting diarrhea chest pain shortness of breath abdominal pain dysuria hematuria.  Denies prodromal symptoms. Patient was brought to [facility] for further evaluation and treatment. In the ED he was found to have a right hip fracture and is s/p  an Open reduction and internal fixation of the right. Patient with confusion and agitation at times but has been more awake and alert. Psych following. Patient seen lethargic after receiving ativan      MEDICATIONS  (STANDING):  acetaminophen     Tablet .. 1000 milliGRAM(s) Oral every 8 hours  buPROPion XL (24-Hour) . 150 milliGRAM(s) Oral daily  calcium carbonate 1250 mG  + Vitamin D (OsCal 500 + D) 1 Tablet(s) Oral two times a day  cyanocobalamin Injectable 1000 MICROGram(s) IntraMuscular daily  donepezil 5 milliGRAM(s) Oral <User Schedule>  enoxaparin Injectable 40 milliGRAM(s) SubCutaneous every 24 hours  flecainide 50 milliGRAM(s) Oral two times a day  fludroCORTISONE 0.1 milliGRAM(s) Oral daily  melatonin 3 milliGRAM(s) Oral at bedtime  memantine 10 milliGRAM(s) Oral two times a day  multivitamin 1 Tablet(s) Oral daily  pantoprazole    Tablet 40 milliGRAM(s) Oral before breakfast  polyethylene glycol 3350 17 Gram(s) Oral daily  senna 2 Tablet(s) Oral at bedtime    MEDICATIONS  (PRN):  LORazepam     Tablet 0.25 milliGRAM(s) Oral every 6 hours PRN Agitation  LORazepam   Injectable 0.25 milliGRAM(s) IV Push every 6 hours PRN severe agitation          VITALS:   T(C): 36.3 (08-05-24 @ 08:17), Max: 36.9 (08-04-24 @ 14:00)  HR: 75 (08-05-24 @ 04:26) (71 - 75)  BP: 131/84 (08-05-24 @ 08:17) (129/80 - 150/89)  RR: 18 (08-05-24 @ 04:26) (18 - 18)  SpO2: 92% (08-05-24 @ 04:26) (92% - 96%)  Wt(kg): --    PHYSICAL EXAM:  GENERAL: NAD, well nourished and conversant  HEAD:  Atraumatic  EYES: EOM, PERRLA, conjunctiva pink and sclera white  ENT: No tonsillar erythema, exudates, or enlargement, moist mucous membranes, good dentition, no lesions  NECK: Supple, No JVD, normal thyroid, carotids with normal upstrokes and no bruits  CHEST/LUNG: Clear to auscultation bilaterally, No rales, rhonchi, wheezing, or rubs  HEART: Regular rate and rhythm, No murmurs, rubs, or gallops  ABDOMEN: Soft, nondistended, no masses, guarding, tenderness or rebound, bowel sounds present  EXTREMITIES:  2+ Peripheral Pulses, No clubbing, cyanosis, or edema.   LYMPH: No lymphadenopathy noted  SKIN: No rashes or lesions  NERVOUS SYSTEM:  Alert & Oriented X1    LABS:                      CAPILLARY BLOOD GLUCOSE          RADIOLOGY & ADDITIONAL TESTS:

## 2024-08-06 NOTE — PROGRESS NOTE ADULT - REASON FOR ADMISSION
R IT fx

## 2024-08-06 NOTE — DISCHARGE NOTE NURSING/CASE MANAGEMENT/SOCIAL WORK - NSDCFUADDAPPT_GEN_ALL_CORE_FT
Please follow up with Dr. Villalobos or Orthopeadist in 2 weeks from surgery date.  Please call office to schedule and confirm your appointment date and time.    Please Follow up with your primary care provider in approximately one month from hospital discharge to discuss your recent surgery/admission and for continuum of care.

## 2024-08-06 NOTE — PROGRESS NOTE ADULT - SUBJECTIVE AND OBJECTIVE BOX
Patient is a 78y old  Male who presents with a chief complaint of R IT fx   Patient s/p right IT fracture fixation with IM nail POD#10  Patient comfortable, confused overnight  No complaints    T(C): 36.6 (08-06-24 @ 04:50), Max: 36.7 (08-05-24 @ 16:19)  HR: 95 (08-06-24 @ 04:50) (93 - 95)  BP: 133/80 (08-06-24 @ 04:50) (131/84 - 138/79)  RR: 18 (08-06-24 @ 04:50) (18 - 18)  SpO2: 96% (08-06-24 @ 04:50) (96% - 96%)    PHYSICAL EXAM:  NAD, Alert  [Right ] Hip: Dressings C/D/I; sensation grossly intact(+) DF/PF; (+) Distal Pulses; No Calf tenderness B/L, PAS

## 2025-05-05 NOTE — BH CONSULTATION LIAISON ASSESSMENT NOTE - SUICIDE ATTEMPT:
Department of Anesthesiology  Preprocedure Note       Name:  Grzegorz Zambrano   Age:  76 y.o.  :  1948                                          MRN:  [id number]         Date:  2025      Surgeon: Surgeon(s):  Brent Hoyt MD    Procedure: Procedure(s):  COLONOSCOPY DIAGNOSTIC    Medications prior to admission:   Prior to Admission medications    Medication Sig Start Date End Date Taking? Authorizing Provider   Fexofenadine HCl (ALLEGRA PO) Take by mouth    ProviderMina MD   amoxicillin (AMOXIL) 500 MG capsule Take 1 capsule by mouth 3 times daily for 10 days 25  Genaro Perdomo, DO   omeprazole (PRILOSEC) 40 MG delayed release capsule TAKE 1 CAPSULE BY MOUTH DAILY 25   Genaro Perdomo, DO   tiZANidine (ZANAFLEX) 4 MG tablet Take 1 tablet by mouth every 8 hours as needed (muscle spasm or pain) 4/15/24   Genaro Perdomo, DO   ibuprofen (ADVIL;MOTRIN) 800 MG tablet Take 1 tablet by mouth every 8 hours as needed for Pain With food 4/15/24   Genaro Perdomo, DO   tamsulosin (FLOMAX) 0.4 MG capsule Take 1 capsule by mouth daily 23   ProviderMina MD       Current medications:    No current facility-administered medications for this encounter.     Current Outpatient Medications   Medication Sig Dispense Refill    Fexofenadine HCl (ALLEGRA PO) Take by mouth      amoxicillin (AMOXIL) 500 MG capsule Take 1 capsule by mouth 3 times daily for 10 days 30 capsule 0    omeprazole (PRILOSEC) 40 MG delayed release capsule TAKE 1 CAPSULE BY MOUTH DAILY 90 capsule 0    tiZANidine (ZANAFLEX) 4 MG tablet Take 1 tablet by mouth every 8 hours as needed (muscle spasm or pain) 30 tablet 0    ibuprofen (ADVIL;MOTRIN) 800 MG tablet Take 1 tablet by mouth every 8 hours as needed for Pain With food 30 tablet 0    tamsulosin (FLOMAX) 0.4 MG capsule Take 1 capsule by mouth daily         Allergies:  No Known Allergies    Problem List:    Patient Active Problem List  None known

## (undated) DEVICE — VENODYNE/SCD SLEEVE CALF MEDIUM

## (undated) DEVICE — TAPE SILK 3"

## (undated) DEVICE — SUT MONOCRYL 3-0 18" PS-2 UNDYED

## (undated) DEVICE — SOL IRR POUR NS 0.9% 500ML

## (undated) DEVICE — DRAPE 3/4 SHEET W REINFORCEMENT 56X77"

## (undated) DEVICE — GLV 8.5 PROTEXIS (WHITE)

## (undated) DEVICE — GLV 7.5 PROTEXIS (WHITE)

## (undated) DEVICE — DRSG WEBRIL 6"

## (undated) DEVICE — DRILL BIT SYNTHES ORTHO CALIBRATED 4.2MM X 330MM

## (undated) DEVICE — WARMING BLANKET UPPER ADULT

## (undated) DEVICE — DRAPE U 47X51" NON STERILE

## (undated) DEVICE — DRAPE SPLIT SHEET 77" X 108"

## (undated) DEVICE — GLV 7 PROTEXIS (WHITE)

## (undated) DEVICE — SYNTHES REAMING ROD WITH BALL TIP 2.5MM 950MM

## (undated) DEVICE — SOL IRR POUR H2O 1000ML

## (undated) DEVICE — POSITIONER FOAM EGG CRATE ULNAR 2PCS (PINK)

## (undated) DEVICE — DRSG TEGADERM 4X4.75"

## (undated) DEVICE — DRILL BIT SYNTHES ORTHO 4.2MM

## (undated) DEVICE — SUT POLYSORB 2-0 30" GS-21 UNDYED

## (undated) DEVICE — DRAPE IOBAN 23" X 23"

## (undated) DEVICE — DRSG ACE BANDAGE 6"

## (undated) DEVICE — SUT POLYSORB 0 30" GS-21 UNDYED

## (undated) DEVICE — DRILL BIT SYNTHES SPINE 3-FLUTE 4.2X145MM

## (undated) DEVICE — PACK HIP PINNING

## (undated) DEVICE — GUIDEWIRE SYNTHES 3.2MM X 400MM

## (undated) DEVICE — POSITIONER FOAM HEADREST (PINK)

## (undated) DEVICE — DRAPE C ARM UNIVERSAL

## (undated) DEVICE — GLV 8 PROTEXIS (WHITE)

## (undated) DEVICE — SUT MONOSOF 3-0 18" C-14

## (undated) DEVICE — BLADE SCALPEL SAFETYLOCK #10